# Patient Record
Sex: FEMALE | Race: WHITE | Employment: OTHER | URBAN - METROPOLITAN AREA
[De-identification: names, ages, dates, MRNs, and addresses within clinical notes are randomized per-mention and may not be internally consistent; named-entity substitution may affect disease eponyms.]

---

## 2017-02-20 DIAGNOSIS — I10 BENIGN ESSENTIAL HTN: ICD-10-CM

## 2017-02-20 DIAGNOSIS — R00.2 PALPITATIONS: ICD-10-CM

## 2017-02-20 RX ORDER — DILTIAZEM HYDROCHLORIDE 240 MG/1
CAPSULE, EXTENDED RELEASE ORAL
Qty: 90 CAPSULE | Refills: 1 | Status: SHIPPED | OUTPATIENT
Start: 2017-02-20 | End: 2017-03-09 | Stop reason: SDUPTHER

## 2017-02-20 RX ORDER — HYDROCHLOROTHIAZIDE 25 MG/1
TABLET ORAL
Qty: 90 TABLET | Refills: 1 | Status: SHIPPED | OUTPATIENT
Start: 2017-02-20 | End: 2017-03-09 | Stop reason: SDUPTHER

## 2017-02-20 RX ORDER — LOSARTAN POTASSIUM 100 MG/1
TABLET ORAL
Qty: 90 TABLET | Refills: 1 | Status: SHIPPED | OUTPATIENT
Start: 2017-02-20 | End: 2017-03-09 | Stop reason: SDUPTHER

## 2017-03-09 ENCOUNTER — OFFICE VISIT (OUTPATIENT)
Dept: PRIMARY CARE CLINIC | Age: 72
End: 2017-03-09

## 2017-03-09 VITALS
BODY MASS INDEX: 33.04 KG/M2 | SYSTOLIC BLOOD PRESSURE: 136 MMHG | HEIGHT: 68 IN | HEART RATE: 70 BPM | OXYGEN SATURATION: 94 % | TEMPERATURE: 97.6 F | RESPIRATION RATE: 16 BRPM | WEIGHT: 218 LBS | DIASTOLIC BLOOD PRESSURE: 80 MMHG

## 2017-03-09 DIAGNOSIS — I49.5 SSS (SICK SINUS SYNDROME) (HCC): Primary | ICD-10-CM

## 2017-03-09 DIAGNOSIS — R00.2 PALPITATIONS: ICD-10-CM

## 2017-03-09 DIAGNOSIS — I10 BENIGN ESSENTIAL HTN: ICD-10-CM

## 2017-03-09 PROCEDURE — 99214 OFFICE O/P EST MOD 30 MIN: CPT | Performed by: FAMILY MEDICINE

## 2017-03-09 RX ORDER — LOSARTAN POTASSIUM 100 MG/1
TABLET ORAL
Qty: 90 TABLET | Refills: 1 | Status: SHIPPED | OUTPATIENT
Start: 2017-03-09 | End: 2017-08-30 | Stop reason: SDUPTHER

## 2017-03-09 RX ORDER — DILTIAZEM HYDROCHLORIDE 240 MG/1
CAPSULE, EXTENDED RELEASE ORAL
Qty: 90 CAPSULE | Refills: 1 | Status: SHIPPED | OUTPATIENT
Start: 2017-03-09 | End: 2017-08-30 | Stop reason: SDUPTHER

## 2017-03-09 RX ORDER — HYDROCHLOROTHIAZIDE 25 MG/1
TABLET ORAL
Qty: 90 TABLET | Refills: 1 | Status: SHIPPED | OUTPATIENT
Start: 2017-03-09 | End: 2017-08-30 | Stop reason: SDUPTHER

## 2017-03-09 ASSESSMENT — ENCOUNTER SYMPTOMS
SHORTNESS OF BREATH: 0
ABDOMINAL PAIN: 0

## 2017-03-09 ASSESSMENT — PATIENT HEALTH QUESTIONNAIRE - PHQ9
SUM OF ALL RESPONSES TO PHQ9 QUESTIONS 1 & 2: 0
2. FEELING DOWN, DEPRESSED OR HOPELESS: 0
1. LITTLE INTEREST OR PLEASURE IN DOING THINGS: 0
SUM OF ALL RESPONSES TO PHQ QUESTIONS 1-9: 0

## 2017-03-24 ENCOUNTER — OFFICE VISIT (OUTPATIENT)
Dept: FAMILY MEDICINE CLINIC | Age: 72
End: 2017-03-24

## 2017-03-24 VITALS
RESPIRATION RATE: 24 BRPM | DIASTOLIC BLOOD PRESSURE: 88 MMHG | HEART RATE: 66 BPM | OXYGEN SATURATION: 93 % | SYSTOLIC BLOOD PRESSURE: 132 MMHG | TEMPERATURE: 97.9 F

## 2017-03-24 DIAGNOSIS — R10.2 PELVIC PAIN IN FEMALE: ICD-10-CM

## 2017-03-24 DIAGNOSIS — N30.90 CYSTITIS: ICD-10-CM

## 2017-03-24 DIAGNOSIS — R10.2 PELVIC PAIN IN FEMALE: Primary | ICD-10-CM

## 2017-03-24 LAB
BILIRUBIN, POC: NORMAL
BLOOD URINE, POC: NORMAL
CLARITY, POC: NORMAL
COLOR, POC: NORMAL
GLUCOSE URINE, POC: NORMAL
KETONES, POC: NORMAL
LEUKOCYTE EST, POC: 125
NITRITE, POC: NORMAL
PH, POC: 7
PROTEIN, POC: NORMAL
SPECIFIC GRAVITY, POC: 1.01
UROBILINOGEN, POC: 3.5

## 2017-03-24 PROCEDURE — 99213 OFFICE O/P EST LOW 20 MIN: CPT | Performed by: NURSE PRACTITIONER

## 2017-03-24 PROCEDURE — 81003 URINALYSIS AUTO W/O SCOPE: CPT | Performed by: NURSE PRACTITIONER

## 2017-03-24 RX ORDER — CIPROFLOXACIN 500 MG/1
500 TABLET, FILM COATED ORAL 2 TIMES DAILY
Qty: 20 TABLET | Refills: 0 | Status: SHIPPED | OUTPATIENT
Start: 2017-03-24 | End: 2017-04-03

## 2017-03-24 ASSESSMENT — ENCOUNTER SYMPTOMS
RHINORRHEA: 0
EYE DISCHARGE: 0
COUGH: 0
WHEEZING: 0
GASTROINTESTINAL NEGATIVE: 1
ALLERGIC/IMMUNOLOGIC NEGATIVE: 1
SORE THROAT: 0
VOICE CHANGE: 0
BACK PAIN: 1
BOWEL INCONTINENCE: 0
ABDOMINAL PAIN: 0
FACIAL SWELLING: 0
TROUBLE SWALLOWING: 0
EYE PAIN: 0
SINUS PRESSURE: 0
CHEST TIGHTNESS: 0
EYE ITCHING: 0
SHORTNESS OF BREATH: 0
EYE REDNESS: 0

## 2017-03-26 LAB — URINE CULTURE, ROUTINE: NORMAL

## 2017-06-05 ENCOUNTER — OFFICE VISIT (OUTPATIENT)
Dept: FAMILY MEDICINE CLINIC | Age: 72
End: 2017-06-05

## 2017-06-05 VITALS
BODY MASS INDEX: 32.89 KG/M2 | WEIGHT: 217 LBS | HEIGHT: 68 IN | SYSTOLIC BLOOD PRESSURE: 149 MMHG | TEMPERATURE: 98.5 F | RESPIRATION RATE: 16 BRPM | HEART RATE: 69 BPM | OXYGEN SATURATION: 95 % | DIASTOLIC BLOOD PRESSURE: 78 MMHG

## 2017-06-05 DIAGNOSIS — B37.0 ORAL CANDIDA: Primary | ICD-10-CM

## 2017-06-05 PROCEDURE — G8399 PT W/DXA RESULTS DOCUMENT: HCPCS | Performed by: NURSE PRACTITIONER

## 2017-06-05 PROCEDURE — 1123F ACP DISCUSS/DSCN MKR DOCD: CPT | Performed by: NURSE PRACTITIONER

## 2017-06-05 PROCEDURE — G8417 CALC BMI ABV UP PARAM F/U: HCPCS | Performed by: NURSE PRACTITIONER

## 2017-06-05 PROCEDURE — 4040F PNEUMOC VAC/ADMIN/RCVD: CPT | Performed by: NURSE PRACTITIONER

## 2017-06-05 PROCEDURE — G8427 DOCREV CUR MEDS BY ELIG CLIN: HCPCS | Performed by: NURSE PRACTITIONER

## 2017-06-05 PROCEDURE — 1090F PRES/ABSN URINE INCON ASSESS: CPT | Performed by: NURSE PRACTITIONER

## 2017-06-05 PROCEDURE — 3014F SCREEN MAMMO DOC REV: CPT | Performed by: NURSE PRACTITIONER

## 2017-06-05 PROCEDURE — 99213 OFFICE O/P EST LOW 20 MIN: CPT | Performed by: NURSE PRACTITIONER

## 2017-06-05 PROCEDURE — 3017F COLORECTAL CA SCREEN DOC REV: CPT | Performed by: NURSE PRACTITIONER

## 2017-06-05 PROCEDURE — 1036F TOBACCO NON-USER: CPT | Performed by: NURSE PRACTITIONER

## 2017-06-05 ASSESSMENT — ENCOUNTER SYMPTOMS
SORE THROAT: 1
SHORTNESS OF BREATH: 0
RHINORRHEA: 0
TROUBLE SWALLOWING: 0
SINUS PRESSURE: 0
WHEEZING: 0
CHEST TIGHTNESS: 0

## 2017-06-07 ENCOUNTER — OFFICE VISIT (OUTPATIENT)
Dept: PRIMARY CARE CLINIC | Age: 72
End: 2017-06-07

## 2017-06-07 VITALS
DIASTOLIC BLOOD PRESSURE: 84 MMHG | RESPIRATION RATE: 16 BRPM | BODY MASS INDEX: 32.89 KG/M2 | TEMPERATURE: 97.8 F | SYSTOLIC BLOOD PRESSURE: 126 MMHG | HEIGHT: 68 IN | HEART RATE: 80 BPM | WEIGHT: 217 LBS

## 2017-06-07 DIAGNOSIS — I10 BENIGN ESSENTIAL HTN: ICD-10-CM

## 2017-06-07 DIAGNOSIS — T78.40XA ALLERGIC REACTION, INITIAL ENCOUNTER: Primary | ICD-10-CM

## 2017-06-07 PROCEDURE — 99214 OFFICE O/P EST MOD 30 MIN: CPT | Performed by: FAMILY MEDICINE

## 2017-06-07 RX ORDER — CETIRIZINE HYDROCHLORIDE 10 MG/1
10 TABLET ORAL DAILY
Qty: 30 TABLET | Refills: 1 | Status: SHIPPED | OUTPATIENT
Start: 2017-06-07 | End: 2017-11-03

## 2017-06-07 RX ORDER — PREDNISONE 10 MG/1
TABLET ORAL
Qty: 20 TABLET | Refills: 0 | Status: SHIPPED | OUTPATIENT
Start: 2017-06-07 | End: 2017-06-21

## 2017-06-07 ASSESSMENT — ENCOUNTER SYMPTOMS
GLOBUS SENSATION: 0
ABDOMINAL PAIN: 0
DIARRHEA: 0
COUGH: 0
NAUSEA: 0
EYE ITCHING: 0
SINUS PRESSURE: 0
HYPERVENTILATION: 0
EYE WATERING: 0
WHEEZING: 0
STRIDOR: 0
SHORTNESS OF BREATH: 0
CONSTIPATION: 0
SORE THROAT: 0
RESPIRATORY NEGATIVE: 1
EYE REDNESS: 0
TROUBLE SWALLOWING: 0
BACK PAIN: 0
VOMITING: 0
DIFFICULTY BREATHING: 0
ALLERGIC REACTION: 1

## 2017-08-03 DIAGNOSIS — Z12.11 SCREENING FOR COLON CANCER: Primary | ICD-10-CM

## 2017-08-03 LAB
CONTROL: POSITIVE
HEMOCCULT STL QL: NEGATIVE

## 2017-08-03 PROCEDURE — 82274 ASSAY TEST FOR BLOOD FECAL: CPT | Performed by: FAMILY MEDICINE

## 2017-08-09 ENCOUNTER — OFFICE VISIT (OUTPATIENT)
Dept: FAMILY MEDICINE CLINIC | Age: 72
End: 2017-08-09

## 2017-08-09 VITALS
HEART RATE: 69 BPM | OXYGEN SATURATION: 97 % | RESPIRATION RATE: 16 BRPM | SYSTOLIC BLOOD PRESSURE: 116 MMHG | TEMPERATURE: 97.6 F | WEIGHT: 216.6 LBS | HEIGHT: 68 IN | BODY MASS INDEX: 32.83 KG/M2 | DIASTOLIC BLOOD PRESSURE: 76 MMHG

## 2017-08-09 DIAGNOSIS — R35.0 FREQUENCY OF URINATION: Primary | ICD-10-CM

## 2017-08-09 LAB
BILIRUBIN, POC: NORMAL
BLOOD URINE, POC: NORMAL
CLARITY, POC: NORMAL
COLOR, POC: NORMAL
GLUCOSE URINE, POC: NORMAL
KETONES, POC: NORMAL
LEUKOCYTE EST, POC: NORMAL
NITRITE, POC: NORMAL
PH, POC: 7
PROTEIN, POC: NORMAL
SPECIFIC GRAVITY, POC: 1.01
UROBILINOGEN, POC: 3.5

## 2017-08-09 PROCEDURE — 81002 URINALYSIS NONAUTO W/O SCOPE: CPT | Performed by: NURSE PRACTITIONER

## 2017-08-09 PROCEDURE — 99213 OFFICE O/P EST LOW 20 MIN: CPT | Performed by: NURSE PRACTITIONER

## 2017-08-09 ASSESSMENT — ENCOUNTER SYMPTOMS
BLOOD IN STOOL: 0
DIARRHEA: 0
ABDOMINAL PAIN: 1
CONSTIPATION: 0
ANAL BLEEDING: 0
NAUSEA: 0
ABDOMINAL DISTENTION: 0
RECTAL PAIN: 0
VOMITING: 0

## 2017-08-11 LAB — URINE CULTURE, ROUTINE: NORMAL

## 2017-08-30 ENCOUNTER — OFFICE VISIT (OUTPATIENT)
Dept: PRIMARY CARE CLINIC | Age: 72
End: 2017-08-30

## 2017-08-30 VITALS
DIASTOLIC BLOOD PRESSURE: 86 MMHG | HEIGHT: 68 IN | BODY MASS INDEX: 32.74 KG/M2 | HEART RATE: 64 BPM | TEMPERATURE: 97.6 F | SYSTOLIC BLOOD PRESSURE: 136 MMHG | WEIGHT: 216 LBS | OXYGEN SATURATION: 95 % | RESPIRATION RATE: 16 BRPM

## 2017-08-30 DIAGNOSIS — D51.9 ANEMIA DUE TO VITAMIN B12 DEFICIENCY, UNSPECIFIED B12 DEFICIENCY TYPE: ICD-10-CM

## 2017-08-30 DIAGNOSIS — Z12.31 SCREENING MAMMOGRAM, ENCOUNTER FOR: ICD-10-CM

## 2017-08-30 DIAGNOSIS — E78.5 DYSLIPIDEMIA: ICD-10-CM

## 2017-08-30 DIAGNOSIS — I10 BENIGN ESSENTIAL HTN: Primary | ICD-10-CM

## 2017-08-30 DIAGNOSIS — I49.5 SSS (SICK SINUS SYNDROME) (HCC): ICD-10-CM

## 2017-08-30 LAB
ALBUMIN SERPL-MCNC: 4.8 G/DL (ref 3.9–4.9)
ALP BLD-CCNC: 93 U/L (ref 40–130)
ALT SERPL-CCNC: 30 U/L (ref 0–33)
ANION GAP SERPL CALCULATED.3IONS-SCNC: 18 MEQ/L (ref 7–13)
AST SERPL-CCNC: 24 U/L (ref 0–35)
BILIRUB SERPL-MCNC: 0.5 MG/DL (ref 0–1.2)
BUN BLDV-MCNC: 12 MG/DL (ref 8–23)
CALCIUM SERPL-MCNC: 9.5 MG/DL (ref 8.6–10.2)
CHLORIDE BLD-SCNC: 100 MEQ/L (ref 98–107)
CHOLESTEROL, TOTAL: 189 MG/DL (ref 0–199)
CO2: 27 MEQ/L (ref 22–29)
CREAT SERPL-MCNC: 0.64 MG/DL (ref 0.5–0.9)
GFR AFRICAN AMERICAN: >60
GFR NON-AFRICAN AMERICAN: >60
GLOBULIN: 2.3 G/DL (ref 2.3–3.5)
GLUCOSE BLD-MCNC: 103 MG/DL (ref 74–109)
HCT VFR BLD CALC: 47.8 % (ref 37–47)
HDLC SERPL-MCNC: 48 MG/DL (ref 40–59)
HEMOGLOBIN: 15.8 G/DL (ref 12–16)
LDL CHOLESTEROL CALCULATED: 116 MG/DL (ref 0–129)
MCH RBC QN AUTO: 30.7 PG (ref 27–31.3)
MCHC RBC AUTO-ENTMCNC: 33 % (ref 33–37)
MCV RBC AUTO: 92.9 FL (ref 82–100)
PDW BLD-RTO: 12.8 % (ref 11.5–14.5)
PLATELET # BLD: 214 K/UL (ref 130–400)
POTASSIUM SERPL-SCNC: 3.8 MEQ/L (ref 3.5–5.1)
RBC # BLD: 5.14 M/UL (ref 4.2–5.4)
SODIUM BLD-SCNC: 145 MEQ/L (ref 132–144)
TOTAL PROTEIN: 7.1 G/DL (ref 6.4–8.1)
TRIGL SERPL-MCNC: 127 MG/DL (ref 0–200)
WBC # BLD: 6.3 K/UL (ref 4.8–10.8)

## 2017-08-30 PROCEDURE — 99214 OFFICE O/P EST MOD 30 MIN: CPT | Performed by: FAMILY MEDICINE

## 2017-08-30 RX ORDER — DILTIAZEM HYDROCHLORIDE 240 MG/1
CAPSULE, EXTENDED RELEASE ORAL
Qty: 90 CAPSULE | Refills: 1 | Status: SHIPPED | OUTPATIENT
Start: 2017-08-30 | End: 2018-02-28 | Stop reason: SDUPTHER

## 2017-08-30 RX ORDER — LOSARTAN POTASSIUM 100 MG/1
TABLET ORAL
Qty: 90 TABLET | Refills: 1 | Status: SHIPPED | OUTPATIENT
Start: 2017-08-30 | End: 2018-02-28 | Stop reason: SDUPTHER

## 2017-08-30 RX ORDER — HYDROCHLOROTHIAZIDE 25 MG/1
TABLET ORAL
Qty: 90 TABLET | Refills: 1 | Status: SHIPPED | OUTPATIENT
Start: 2017-08-30 | End: 2018-02-28 | Stop reason: SDUPTHER

## 2017-08-30 ASSESSMENT — ENCOUNTER SYMPTOMS
WHEEZING: 0
RESPIRATORY NEGATIVE: 1
DIARRHEA: 0
ORTHOPNEA: 0
BACK PAIN: 0
GASTROINTESTINAL NEGATIVE: 1
BLURRED VISION: 0
CONSTIPATION: 0
EYE ITCHING: 0
PHOTOPHOBIA: 0
EYES NEGATIVE: 1
SHORTNESS OF BREATH: 0
EYE REDNESS: 0
ABDOMINAL PAIN: 0

## 2017-09-11 ENCOUNTER — HOSPITAL ENCOUNTER (OUTPATIENT)
Dept: WOMENS IMAGING | Age: 72
Discharge: HOME | End: 2017-09-11
Payer: MEDICARE

## 2017-09-11 DIAGNOSIS — Z12.31 SCREENING MAMMOGRAM, ENCOUNTER FOR: ICD-10-CM

## 2017-09-11 PROCEDURE — G0202 SCR MAMMO BI INCL CAD: HCPCS

## 2017-11-03 ENCOUNTER — OFFICE VISIT (OUTPATIENT)
Dept: PRIMARY CARE CLINIC | Age: 72
End: 2017-11-03

## 2017-11-03 ENCOUNTER — TELEPHONE (OUTPATIENT)
Dept: ADMINISTRATIVE | Age: 72
End: 2017-11-03

## 2017-11-03 VITALS
HEIGHT: 68 IN | HEART RATE: 76 BPM | BODY MASS INDEX: 32.74 KG/M2 | RESPIRATION RATE: 16 BRPM | DIASTOLIC BLOOD PRESSURE: 78 MMHG | WEIGHT: 216 LBS | OXYGEN SATURATION: 95 % | TEMPERATURE: 98.1 F | SYSTOLIC BLOOD PRESSURE: 154 MMHG

## 2017-11-03 DIAGNOSIS — Z95.0 PRESENCE OF CARDIAC PACEMAKER: ICD-10-CM

## 2017-11-03 DIAGNOSIS — I10 BENIGN ESSENTIAL HTN: ICD-10-CM

## 2017-11-03 DIAGNOSIS — R00.2 HEART PALPITATIONS: ICD-10-CM

## 2017-11-03 DIAGNOSIS — I49.5 SSS (SICK SINUS SYNDROME) (HCC): ICD-10-CM

## 2017-11-03 DIAGNOSIS — R00.2 HEART PALPITATIONS: Primary | ICD-10-CM

## 2017-11-03 LAB
ANION GAP SERPL CALCULATED.3IONS-SCNC: 17 MEQ/L (ref 7–13)
BUN BLDV-MCNC: 12 MG/DL (ref 8–23)
CALCIUM SERPL-MCNC: 9.8 MG/DL (ref 8.6–10.2)
CHLORIDE BLD-SCNC: 100 MEQ/L (ref 98–107)
CO2: 26 MEQ/L (ref 22–29)
CREAT SERPL-MCNC: 0.84 MG/DL (ref 0.5–0.9)
GFR AFRICAN AMERICAN: >60
GFR NON-AFRICAN AMERICAN: >60
GLUCOSE BLD-MCNC: 93 MG/DL (ref 74–109)
MAGNESIUM: 2.3 MG/DL (ref 1.7–2.3)
POTASSIUM SERPL-SCNC: 3.7 MEQ/L (ref 3.5–5.1)
SODIUM BLD-SCNC: 143 MEQ/L (ref 132–144)

## 2017-11-03 PROCEDURE — 93000 ELECTROCARDIOGRAM COMPLETE: CPT | Performed by: FAMILY MEDICINE

## 2017-11-03 PROCEDURE — 99214 OFFICE O/P EST MOD 30 MIN: CPT | Performed by: FAMILY MEDICINE

## 2017-11-03 RX ORDER — NEBIVOLOL 5 MG/1
TABLET ORAL
Qty: 14 TABLET | Refills: 0 | COMMUNITY
Start: 2017-11-03 | End: 2018-02-28

## 2017-11-03 ASSESSMENT — ENCOUNTER SYMPTOMS
ABDOMINAL PAIN: 0
DIARRHEA: 0
BACK PAIN: 0
RESPIRATORY NEGATIVE: 1
CONSTIPATION: 0
VOMITING: 0
SHORTNESS OF BREATH: 0
SORE THROAT: 0
WHEEZING: 0
SINUS PRESSURE: 0
NAUSEA: 0
COUGH: 0

## 2017-11-03 NOTE — TELEPHONE ENCOUNTER
LM for patient to call office. Pt was having heart palpitations so I wanted to check on her to see how she is feeling.

## 2017-11-03 NOTE — PROGRESS NOTES
macrocrystals]; Nitrofuran derivatives; and Nystatin    Review of Systems   Constitutional: Negative for chills, fatigue, fever and malaise/fatigue. HENT: Negative for congestion, ear pain, sinus pressure and sore throat. Respiratory: Negative. Negative for cough, shortness of breath and wheezing. Cardiovascular: Positive for palpitations. Negative for chest pain. Gastrointestinal: Negative for abdominal pain, constipation, diarrhea, nausea and vomiting. Musculoskeletal: Negative for back pain and neck pain. Neurological: Negative for dizziness, numbness and headaches. Psychiatric/Behavioral: The patient is not nervous/anxious. Objective:   BP (!) 154/78 (Site: Right Arm, Position: Sitting, Cuff Size: Large Adult)   Pulse 76   Temp 98.1 °F (36.7 °C) (Tympanic)   Resp 16   Ht 5' 8\" (1.727 m)   Wt 216 lb (98 kg)   SpO2 95%   Breastfeeding? No   BMI 32.84 kg/m²     Physical Exam   Constitutional: She is oriented to person, place, and time. She appears well-developed and well-nourished. HENT:   Head: Normocephalic and atraumatic. Eyes: Conjunctivae and EOM are normal. Pupils are equal, round, and reactive to light. Neck: Normal range of motion. Neck supple. No thyromegaly present. Cardiovascular: Normal rate, regular rhythm and normal heart sounds. Occasional extrasystoles are present. No murmur heard. Pulmonary/Chest: Effort normal and breath sounds normal. She has no wheezes. She exhibits no tenderness. Abdominal: Soft. Bowel sounds are normal. There is no tenderness. Musculoskeletal: Normal range of motion. She exhibits no edema or tenderness. Lymphadenopathy:     She has no cervical adenopathy. Neurological: She is alert and oriented to person, place, and time. She has normal reflexes. Coordination normal.   Skin: Skin is warm and dry. No rash noted. Psychiatric: She has a normal mood and affect.  Thought content normal.   Nursing note and vitals

## 2018-01-09 ENCOUNTER — OFFICE VISIT (OUTPATIENT)
Dept: PRIMARY CARE CLINIC | Age: 73
End: 2018-01-09

## 2018-01-09 VITALS
DIASTOLIC BLOOD PRESSURE: 76 MMHG | WEIGHT: 217 LBS | HEART RATE: 69 BPM | HEIGHT: 68 IN | BODY MASS INDEX: 32.89 KG/M2 | OXYGEN SATURATION: 93 % | TEMPERATURE: 98.9 F | SYSTOLIC BLOOD PRESSURE: 130 MMHG | RESPIRATION RATE: 16 BRPM

## 2018-01-09 DIAGNOSIS — N39.0 URINARY TRACT INFECTION WITHOUT HEMATURIA, SITE UNSPECIFIED: ICD-10-CM

## 2018-01-09 DIAGNOSIS — I10 BENIGN ESSENTIAL HTN: ICD-10-CM

## 2018-01-09 DIAGNOSIS — R10.30 LOWER ABDOMINAL PAIN: Primary | ICD-10-CM

## 2018-01-09 LAB
BILIRUBIN, POC: ABNORMAL
BLOOD URINE, POC: ABNORMAL
CLARITY, POC: ABNORMAL
COLOR, POC: ABNORMAL
GLUCOSE URINE, POC: ABNORMAL
KETONES, POC: ABNORMAL
LEUKOCYTE EST, POC: ABNORMAL
NITRITE, POC: ABNORMAL
PH, POC: 6
PROTEIN, POC: ABNORMAL
SPECIFIC GRAVITY, POC: 1.01
UROBILINOGEN, POC: ABNORMAL

## 2018-01-09 PROCEDURE — 99214 OFFICE O/P EST MOD 30 MIN: CPT | Performed by: FAMILY MEDICINE

## 2018-01-09 PROCEDURE — 81002 URINALYSIS NONAUTO W/O SCOPE: CPT | Performed by: FAMILY MEDICINE

## 2018-01-09 ASSESSMENT — ENCOUNTER SYMPTOMS
WHEEZING: 0
FLATUS: 1
CONSTIPATION: 0
BACK PAIN: 1
DIARRHEA: 0
COUGH: 0
NAUSEA: 0
SORE THROAT: 0
ABDOMINAL PAIN: 1
VOMITING: 0
RESPIRATORY NEGATIVE: 1
SINUS PRESSURE: 0
SHORTNESS OF BREATH: 0

## 2018-01-11 LAB — URINE CULTURE, ROUTINE: NORMAL

## 2018-01-18 ENCOUNTER — HOSPITAL ENCOUNTER (OUTPATIENT)
Dept: CT IMAGING | Age: 73
Discharge: HOME | End: 2018-01-18
Payer: MEDICARE

## 2018-01-18 DIAGNOSIS — R10.30 LOWER ABDOMINAL PAIN: ICD-10-CM

## 2018-01-18 PROCEDURE — 2500000003 HC RX 250 WO HCPCS: Performed by: FAMILY MEDICINE

## 2018-01-18 PROCEDURE — 74177 CT ABD & PELVIS W/CONTRAST: CPT

## 2018-01-18 PROCEDURE — 6360000004 HC RX CONTRAST MEDICATION: Performed by: FAMILY MEDICINE

## 2018-01-18 RX ADMIN — IOPAMIDOL 100 ML: 755 INJECTION, SOLUTION INTRAVENOUS at 10:37

## 2018-01-18 RX ADMIN — BARIUM SULFATE 450 ML: 20 SUSPENSION ORAL at 09:30

## 2018-01-23 ENCOUNTER — OFFICE VISIT (OUTPATIENT)
Dept: PRIMARY CARE CLINIC | Age: 73
End: 2018-01-23
Payer: MEDICARE

## 2018-01-23 VITALS
WEIGHT: 217 LBS | SYSTOLIC BLOOD PRESSURE: 160 MMHG | OXYGEN SATURATION: 97 % | DIASTOLIC BLOOD PRESSURE: 80 MMHG | HEART RATE: 82 BPM | RESPIRATION RATE: 18 BRPM | BODY MASS INDEX: 32.89 KG/M2 | TEMPERATURE: 97.9 F | HEIGHT: 68 IN

## 2018-01-23 DIAGNOSIS — Z95.0 PRESENCE OF CARDIAC PACEMAKER: ICD-10-CM

## 2018-01-23 DIAGNOSIS — Z12.4 ENCOUNTER FOR SCREENING FOR MALIGNANT NEOPLASM OF CERVIX: ICD-10-CM

## 2018-01-23 DIAGNOSIS — R00.2 PALPITATIONS: ICD-10-CM

## 2018-01-23 DIAGNOSIS — I49.5 SSS (SICK SINUS SYNDROME) (HCC): ICD-10-CM

## 2018-01-23 DIAGNOSIS — R10.2 PELVIC PAIN IN FEMALE: ICD-10-CM

## 2018-01-23 DIAGNOSIS — I10 BENIGN ESSENTIAL HTN: Primary | ICD-10-CM

## 2018-01-23 PROCEDURE — 99214 OFFICE O/P EST MOD 30 MIN: CPT | Performed by: FAMILY MEDICINE

## 2018-01-23 RX ORDER — CIPROFLOXACIN 500 MG/1
TABLET, FILM COATED ORAL
Qty: 20 TABLET | Refills: 0 | Status: SHIPPED | OUTPATIENT
Start: 2018-01-23 | End: 2018-02-02

## 2018-01-23 ASSESSMENT — ENCOUNTER SYMPTOMS
BACK PAIN: 1
DIARRHEA: 0
VOMITING: 0
NAUSEA: 0
HEMATOCHEZIA: 0
ABDOMINAL PAIN: 1
RESPIRATORY NEGATIVE: 1
EYE REDNESS: 0
SHORTNESS OF BREATH: 0
CONSTIPATION: 0
EYES NEGATIVE: 1
EYE ITCHING: 0
PHOTOPHOBIA: 0
WHEEZING: 0
FLATUS: 0
BELCHING: 0

## 2018-01-23 NOTE — PROGRESS NOTES
right labia. There is no rash or lesion on the left labia. Cervix exhibits no discharge and no friability. There is bleeding in the vagina. No tenderness in the vagina. No vaginal discharge found. Musculoskeletal: Normal range of motion. She exhibits no edema or tenderness. Lymphadenopathy:     She has no cervical adenopathy. Neurological: She is alert and oriented to person, place, and time. She has normal reflexes. Coordination normal.   Skin: Skin is warm and dry. No rash noted. Psychiatric: She has a normal mood and affect. Thought content normal.   Nursing note and vitals reviewed. Assessment:     1. Benign essential HTN     2. SSS (sick sinus syndrome) ([facility] [address]     3. Presence of cardiac pacemaker     4. Palpitations     5. Pelvic pain in female  US Pelvis Complete    ciprofloxacin (CIPRO) 500 MG tablet   6. Encounter for screening for malignant neoplasm of cervix   PAP SMEAR       Plan:      Orders Placed This Encounter   Procedures    US Pelvis Complete     Standing Status:   Future     Standing Expiration Date:   1/23/2019    PAP SMEAR     Patient History:    No LMP recorded. Patient is postmenopausal.  OBGYN Status: Postmenopausal  Past Surgical History:  1997: PACEMAKER INSERTION      Smoking status: Never Smoker                                                              Smokeless tobacco: Never Used                           Standing Status:   Future     Standing Expiration Date:   1/23/2019     Order Specific Question:   Collection Type     Answer: Thin Prep     Order Specific Question:   Prior Abnormal Pap Test     Answer:   No     Order Specific Question:   Screening or Diagnostic     Answer:   Screening     Order Specific Question:   HPV Requested?      Answer:   Yes - If Abnormal Reflex HPV     Order Specific Question:   High Risk Patient     Answer:   N/A     Orders Placed This Encounter   Medications    ciprofloxacin (CIPRO) 500 MG tablet     Sig: One bid     Dispense:  20 tablet

## 2018-01-24 ENCOUNTER — HOSPITAL ENCOUNTER (OUTPATIENT)
Dept: ULTRASOUND IMAGING | Age: 73
Discharge: HOME | End: 2018-01-24
Payer: MEDICARE

## 2018-01-24 DIAGNOSIS — R10.2 PELVIC PAIN IN FEMALE: ICD-10-CM

## 2018-01-24 PROCEDURE — 76856 US EXAM PELVIC COMPLETE: CPT

## 2018-01-24 PROCEDURE — 76830 TRANSVAGINAL US NON-OB: CPT

## 2018-01-26 ENCOUNTER — TELEPHONE (OUTPATIENT)
Dept: PRIMARY CARE CLINIC | Age: 73
End: 2018-01-26

## 2018-02-28 ENCOUNTER — OFFICE VISIT (OUTPATIENT)
Dept: PRIMARY CARE CLINIC | Age: 73
End: 2018-02-28
Payer: MEDICARE

## 2018-02-28 VITALS
OXYGEN SATURATION: 95 % | HEIGHT: 68 IN | WEIGHT: 217 LBS | TEMPERATURE: 97.8 F | BODY MASS INDEX: 32.89 KG/M2 | SYSTOLIC BLOOD PRESSURE: 160 MMHG | HEART RATE: 57 BPM | RESPIRATION RATE: 18 BRPM | DIASTOLIC BLOOD PRESSURE: 78 MMHG

## 2018-02-28 DIAGNOSIS — I10 BENIGN ESSENTIAL HTN: Primary | ICD-10-CM

## 2018-02-28 DIAGNOSIS — I49.5 SSS (SICK SINUS SYNDROME) (HCC): ICD-10-CM

## 2018-02-28 DIAGNOSIS — Z95.0 PRESENCE OF CARDIAC PACEMAKER: ICD-10-CM

## 2018-02-28 PROCEDURE — 99213 OFFICE O/P EST LOW 20 MIN: CPT | Performed by: FAMILY MEDICINE

## 2018-02-28 RX ORDER — DILTIAZEM HYDROCHLORIDE 240 MG/1
CAPSULE, EXTENDED RELEASE ORAL
Qty: 90 CAPSULE | Refills: 1 | Status: SHIPPED | OUTPATIENT
Start: 2018-02-28 | End: 2018-10-01

## 2018-02-28 RX ORDER — HYDRALAZINE HYDROCHLORIDE 25 MG/1
25 TABLET, FILM COATED ORAL 2 TIMES DAILY
Qty: 60 TABLET | Refills: 1 | Status: SHIPPED | OUTPATIENT
Start: 2018-02-28 | End: 2018-03-30 | Stop reason: DRUGHIGH

## 2018-02-28 RX ORDER — LOSARTAN POTASSIUM 100 MG/1
TABLET ORAL
Qty: 90 TABLET | Refills: 1 | Status: SHIPPED | OUTPATIENT
Start: 2018-02-28 | End: 2018-03-30

## 2018-02-28 RX ORDER — HYDROCHLOROTHIAZIDE 25 MG/1
TABLET ORAL
Qty: 90 TABLET | Refills: 1 | Status: SHIPPED | OUTPATIENT
Start: 2018-02-28 | End: 2018-06-14

## 2018-02-28 ASSESSMENT — ENCOUNTER SYMPTOMS
BACK PAIN: 0
BLURRED VISION: 0
COUGH: 0
SHORTNESS OF BREATH: 0
ORTHOPNEA: 0
ABDOMINAL DISTENTION: 0
BLOOD IN STOOL: 0

## 2018-03-30 ENCOUNTER — OFFICE VISIT (OUTPATIENT)
Dept: PRIMARY CARE CLINIC | Age: 73
End: 2018-03-30
Payer: MEDICARE

## 2018-03-30 VITALS
HEIGHT: 68 IN | DIASTOLIC BLOOD PRESSURE: 80 MMHG | RESPIRATION RATE: 14 BRPM | OXYGEN SATURATION: 96 % | WEIGHT: 217.6 LBS | BODY MASS INDEX: 32.98 KG/M2 | SYSTOLIC BLOOD PRESSURE: 142 MMHG | HEART RATE: 72 BPM | TEMPERATURE: 97.9 F

## 2018-03-30 DIAGNOSIS — Z95.0 PRESENCE OF CARDIAC PACEMAKER: ICD-10-CM

## 2018-03-30 DIAGNOSIS — I49.5 SSS (SICK SINUS SYNDROME) (HCC): ICD-10-CM

## 2018-03-30 DIAGNOSIS — R00.2 PALPITATIONS: ICD-10-CM

## 2018-03-30 DIAGNOSIS — I10 BENIGN ESSENTIAL HTN: Primary | ICD-10-CM

## 2018-03-30 PROCEDURE — 99213 OFFICE O/P EST LOW 20 MIN: CPT | Performed by: FAMILY MEDICINE

## 2018-03-30 RX ORDER — LOSARTAN POTASSIUM 100 MG/1
TABLET ORAL
Qty: 90 TABLET | Refills: 1 | Status: SHIPPED | OUTPATIENT
Start: 2018-03-30 | End: 2018-10-09 | Stop reason: SDUPTHER

## 2018-03-30 RX ORDER — HYDRALAZINE HYDROCHLORIDE 25 MG/1
25 TABLET, FILM COATED ORAL DAILY
Qty: 60 TABLET | Refills: 1 | Status: SHIPPED | OUTPATIENT
Start: 2018-03-30 | End: 2018-06-14

## 2018-03-30 ASSESSMENT — ENCOUNTER SYMPTOMS
APNEA: 0
STRIDOR: 0
CHEST TIGHTNESS: 0
CONSTIPATION: 0
PHOTOPHOBIA: 0
DIARRHEA: 0
SHORTNESS OF BREATH: 0
EYE REDNESS: 0
EYE DISCHARGE: 0
NAUSEA: 0
CHOKING: 0
COLOR CHANGE: 0
ORTHOPNEA: 0
ABDOMINAL PAIN: 0
BLURRED VISION: 0
FACIAL SWELLING: 0
EYE PAIN: 0
WHEEZING: 0

## 2018-03-30 NOTE — PROGRESS NOTES
Subjective:      Patient ID: Brendan Montes is a 67 y.o. female who presents today for:  Chief Complaint   Patient presents with    Hypertension     follow up. LOV prscribed hyralazine. States this medication is causing palpatations at night, would like to discuss. Denies chest pain, SOB, dizziness. Hypertension   This is a chronic problem. The current episode started more than 1 year ago. The problem has been waxing and waning since onset. The problem is controlled. Associated symptoms include palpitations. Pertinent negatives include no anxiety, blurred vision, chest pain, headaches, malaise/fatigue, neck pain, orthopnea, peripheral edema, PND, shortness of breath or sweats. There are no associated agents to hypertension. Risk factors for coronary artery disease include obesity and dyslipidemia. The current treatment provides moderate improvement. There are no compliance problems. Patient states her new medication hydralazine is causing her to have palpitations, they're worse at night. She is unsure if it's the medication or something else. She has had an echo done in the past (2013), her last EKG was done in 11/2017. She has a pacemaker, battery is to be replaced later this year, and does see a cardiologist regularly. She was taken off of the [facility] last visit.        Past Medical History:   Diagnosis Date    Hx of syncope     Hypertension     Presence of cardiac pacemaker 1/23/2008    [employer] ; 1997 [employer] leads    Second degree AV block 1997    did not progress to complete heart block     Past Surgical History:   Procedure Laterality Date    PACEMAKER INSERTION  1997     Family History   Problem Relation Age of Onset    High Blood Pressure Mother     Other Father     Substance Abuse Father      Social History     Social History    Marital status:      Spouse name: N/A    Number of children: N/A    Years of education: N/A     Occupational History    Not on 33.09 kg/m²     Physical Exam   Constitutional: She is oriented to person, place, and time. She appears well-developed and well-nourished. HENT:   Head: Normocephalic and atraumatic. Eyes: Conjunctivae and EOM are normal. Pupils are equal, round, and reactive to light. Neck: Normal range of motion. Neck supple. No thyromegaly present. Cardiovascular: Normal rate, regular rhythm and normal heart sounds. No murmur heard. Pulmonary/Chest: Effort normal and breath sounds normal. She has no wheezes. She exhibits no tenderness. Abdominal: Soft. Bowel sounds are normal. There is no tenderness. Musculoskeletal: Normal range of motion. She exhibits no edema or tenderness. Lymphadenopathy:     She has no cervical adenopathy. Neurological: She is alert and oriented to person, place, and time. She has normal reflexes. Coordination normal.   Skin: Skin is warm and dry. No rash noted. Psychiatric: She has a normal mood and affect. Thought content normal.   Nursing note and vitals reviewed. Assessment:     1. Benign essential HTN  losartan (COZAAR) 100 MG tablet    hydrALAZINE (APRESOLINE) 25 MG tablet   2. Palpitations  Amb External Referral To Cardiac Electrophysiology   3. SSS (sick sinus syndrome) ([facility] [address]     4.  Presence of cardiac pacemaker         Plan:      Orders Placed This Encounter   Procedures    Amb External Referral To Cardiac Electrophysiology     Referral Priority:   Routine     Referral Type:   Consult for Advice and Opinion     Referral Reason:   Specialty Services Required     Referred to Provider:   Krysten Laird MD     Requested Specialty:   Cardiac Electrophysiology     Number of Visits Requested:   1     Orders Placed This Encounter   Medications    losartan (COZAAR) 100 MG tablet     Sig: take 1 tablet by mouth once daily     Dispense:  90 tablet     Refill:  1    hydrALAZINE (APRESOLINE) 25 MG tablet     Sig: Take 1 tablet by mouth daily     Dispense:  60 tablet     Refill: 1       Controlled Substances Monitoring:      Return in about 3 months (around 6/30/2018) for Review of Labs & Diagnostic Testing, Routine follow up. Anjelica Dave CMA  , am scribing for and in the presence of Tianna Rashid DO. Electronically signed by : James Vigil CMA      I, Tianna Rashid DO, personally performed the services described in this documentation, as scribed by James Vigil in my presence, and it is both accurate and complete.  Electronically signed by: Tianna Rashid DO    3/31/18 5:38 PM    Tianna Rashid DO

## 2018-05-02 ENCOUNTER — APPOINTMENT (OUTPATIENT)
Dept: GENERAL RADIOLOGY | Age: 73
End: 2018-05-02
Payer: MEDICARE

## 2018-05-02 ENCOUNTER — HOSPITAL ENCOUNTER (EMERGENCY)
Age: 73
Discharge: HOME | End: 2018-05-02
Attending: EMERGENCY MEDICINE
Payer: MEDICARE

## 2018-05-02 VITALS
WEIGHT: 220.46 LBS | RESPIRATION RATE: 18 BRPM | HEART RATE: 75 BPM | BODY MASS INDEX: 33.41 KG/M2 | SYSTOLIC BLOOD PRESSURE: 172 MMHG | HEIGHT: 68 IN | OXYGEN SATURATION: 99 % | DIASTOLIC BLOOD PRESSURE: 77 MMHG

## 2018-05-02 DIAGNOSIS — I48.0 PAROXYSMAL ATRIAL FIBRILLATION (HCC): Primary | ICD-10-CM

## 2018-05-02 DIAGNOSIS — I10 ESSENTIAL HYPERTENSION: ICD-10-CM

## 2018-05-02 DIAGNOSIS — R00.2 PALPITATIONS: ICD-10-CM

## 2018-05-02 LAB
ALBUMIN SERPL-MCNC: 5.1 G/DL (ref 3.9–4.9)
ALP BLD-CCNC: 97 U/L (ref 40–130)
ALT SERPL-CCNC: 31 U/L (ref 0–33)
ANION GAP SERPL CALCULATED.3IONS-SCNC: 17 MEQ/L (ref 7–13)
AST SERPL-CCNC: 24 U/L (ref 0–35)
BASOPHILS ABSOLUTE: 0 K/UL (ref 0–0.2)
BASOPHILS RELATIVE PERCENT: 0.2 %
BILIRUB SERPL-MCNC: 0.2 MG/DL (ref 0–1.2)
BUN BLDV-MCNC: 12 MG/DL (ref 8–23)
CALCIUM SERPL-MCNC: 10.4 MG/DL (ref 8.6–10.2)
CHLORIDE BLD-SCNC: 101 MEQ/L (ref 98–107)
CO2: 27 MEQ/L (ref 22–29)
CREAT SERPL-MCNC: 0.95 MG/DL (ref 0.5–0.9)
EKG ATRIAL RATE: 129 BPM
EKG ATRIAL RATE: 72 BPM
EKG P AXIS: 16 DEGREES
EKG P-R INTERVAL: 172 MS
EKG Q-T INTERVAL: 312 MS
EKG Q-T INTERVAL: 382 MS
EKG QRS DURATION: 90 MS
EKG QRS DURATION: 96 MS
EKG QTC CALCULATION (BAZETT): 418 MS
EKG QTC CALCULATION (BAZETT): 438 MS
EKG R AXIS: 28 DEGREES
EKG R AXIS: 5 DEGREES
EKG T AXIS: -17 DEGREES
EKG T AXIS: 248 DEGREES
EKG VENTRICULAR RATE: 119 BPM
EKG VENTRICULAR RATE: 72 BPM
EOSINOPHILS ABSOLUTE: 0.1 K/UL (ref 0–0.7)
EOSINOPHILS RELATIVE PERCENT: 1.1 %
GFR AFRICAN AMERICAN: >60
GFR NON-AFRICAN AMERICAN: 57.7
GLOBULIN: 2.7 G/DL (ref 2.3–3.5)
GLUCOSE BLD-MCNC: 132 MG/DL (ref 74–109)
HCT VFR BLD CALC: 46.2 % (ref 37–47)
HEMOGLOBIN: 16 G/DL (ref 12–16)
INR BLD: 1
LYMPHOCYTES ABSOLUTE: 3.4 K/UL (ref 1–4.8)
LYMPHOCYTES RELATIVE PERCENT: 38 %
MAGNESIUM: 2 MG/DL (ref 1.7–2.3)
MCH RBC QN AUTO: 31.3 PG (ref 27–31.3)
MCHC RBC AUTO-ENTMCNC: 34.7 % (ref 33–37)
MCV RBC AUTO: 90.2 FL (ref 82–100)
MONOCYTES ABSOLUTE: 0.6 K/UL (ref 0.2–0.8)
MONOCYTES RELATIVE PERCENT: 6.6 %
NEUTROPHILS ABSOLUTE: 4.8 K/UL (ref 1.4–6.5)
NEUTROPHILS RELATIVE PERCENT: 54.1 %
PDW BLD-RTO: 12.7 % (ref 11.5–14.5)
PLATELET # BLD: 242 K/UL (ref 130–400)
POTASSIUM SERPL-SCNC: 3.3 MEQ/L (ref 3.5–5.1)
PRO-BNP: 74 PG/ML
PROTHROMBIN TIME: 9.9 SEC (ref 9.6–12.3)
RBC # BLD: 5.13 M/UL (ref 4.2–5.4)
SODIUM BLD-SCNC: 145 MEQ/L (ref 132–144)
TOTAL PROTEIN: 7.8 G/DL (ref 6.4–8.1)
TROPONIN: <0.01 NG/ML (ref 0–0.01)
WBC # BLD: 8.9 K/UL (ref 4.8–10.8)

## 2018-05-02 PROCEDURE — 96374 THER/PROPH/DIAG INJ IV PUSH: CPT

## 2018-05-02 PROCEDURE — 2580000003 HC RX 258: Performed by: EMERGENCY MEDICINE

## 2018-05-02 PROCEDURE — 6360000002 HC RX W HCPCS: Performed by: EMERGENCY MEDICINE

## 2018-05-02 PROCEDURE — 6370000000 HC RX 637 (ALT 250 FOR IP): Performed by: EMERGENCY MEDICINE

## 2018-05-02 PROCEDURE — 96372 THER/PROPH/DIAG INJ SC/IM: CPT

## 2018-05-02 PROCEDURE — 84484 ASSAY OF TROPONIN QUANT: CPT

## 2018-05-02 PROCEDURE — 85025 COMPLETE CBC W/AUTO DIFF WBC: CPT

## 2018-05-02 PROCEDURE — 96375 TX/PRO/DX INJ NEW DRUG ADDON: CPT

## 2018-05-02 PROCEDURE — 83735 ASSAY OF MAGNESIUM: CPT

## 2018-05-02 PROCEDURE — 85610 PROTHROMBIN TIME: CPT

## 2018-05-02 PROCEDURE — 80053 COMPREHEN METABOLIC PANEL: CPT

## 2018-05-02 PROCEDURE — 2500000003 HC RX 250 WO HCPCS: Performed by: EMERGENCY MEDICINE

## 2018-05-02 PROCEDURE — 84443 ASSAY THYROID STIM HORMONE: CPT

## 2018-05-02 PROCEDURE — 71045 X-RAY EXAM CHEST 1 VIEW: CPT

## 2018-05-02 PROCEDURE — 93005 ELECTROCARDIOGRAM TRACING: CPT

## 2018-05-02 PROCEDURE — 99285 EMERGENCY DEPT VISIT HI MDM: CPT

## 2018-05-02 PROCEDURE — 36415 COLL VENOUS BLD VENIPUNCTURE: CPT

## 2018-05-02 PROCEDURE — 83880 ASSAY OF NATRIURETIC PEPTIDE: CPT

## 2018-05-02 RX ORDER — POTASSIUM CHLORIDE 750 MG/1
20 TABLET, FILM COATED, EXTENDED RELEASE ORAL ONCE
Status: COMPLETED | OUTPATIENT
Start: 2018-05-02 | End: 2018-05-02

## 2018-05-02 RX ORDER — SODIUM CHLORIDE 0.9 % (FLUSH) 0.9 %
3 SYRINGE (ML) INJECTION EVERY 8 HOURS
Status: DISCONTINUED | OUTPATIENT
Start: 2018-05-02 | End: 2018-05-03 | Stop reason: HOSPADM

## 2018-05-02 RX ORDER — DILTIAZEM HYDROCHLORIDE 5 MG/ML
10 INJECTION INTRAVENOUS ONCE
Status: COMPLETED | OUTPATIENT
Start: 2018-05-02 | End: 2018-05-02

## 2018-05-02 RX ORDER — HYDRALAZINE HYDROCHLORIDE 20 MG/ML
10 INJECTION INTRAMUSCULAR; INTRAVENOUS ONCE
Status: COMPLETED | OUTPATIENT
Start: 2018-05-02 | End: 2018-05-02

## 2018-05-02 RX ORDER — METOPROLOL TARTRATE 5 MG/5ML
5 INJECTION INTRAVENOUS ONCE
Status: DISCONTINUED | OUTPATIENT
Start: 2018-05-02 | End: 2018-05-02

## 2018-05-02 RX ORDER — LORAZEPAM 2 MG/ML
1 INJECTION INTRAMUSCULAR ONCE
Status: DISCONTINUED | OUTPATIENT
Start: 2018-05-02 | End: 2018-05-02

## 2018-05-02 RX ADMIN — DILTIAZEM HYDROCHLORIDE 10 MG: 5 INJECTION INTRAVENOUS at 21:36

## 2018-05-02 RX ADMIN — HYDRALAZINE HYDROCHLORIDE 10 MG: 20 INJECTION INTRAMUSCULAR; INTRAVENOUS at 22:25

## 2018-05-02 RX ADMIN — ENOXAPARIN SODIUM 100 MG: 100 INJECTION SUBCUTANEOUS at 21:53

## 2018-05-02 RX ADMIN — POTASSIUM CHLORIDE 20 MEQ: 10 TABLET, EXTENDED RELEASE ORAL at 22:08

## 2018-05-02 RX ADMIN — Medication 3 ML: at 21:35

## 2018-05-02 ASSESSMENT — ENCOUNTER SYMPTOMS
SORE THROAT: 0
SHORTNESS OF BREATH: 0
EYE DISCHARGE: 0
SINUS PRESSURE: 0
VOMITING: 0
EYE REDNESS: 0
TROUBLE SWALLOWING: 0
ABDOMINAL PAIN: 0
COUGH: 0
EYE PAIN: 0
CHEST TIGHTNESS: 0
BACK PAIN: 0
CHOKING: 0
CONSTIPATION: 0
WHEEZING: 0
VOICE CHANGE: 0
BLOOD IN STOOL: 0
STRIDOR: 0
FACIAL SWELLING: 0
DIARRHEA: 0

## 2018-05-03 ENCOUNTER — OFFICE VISIT (OUTPATIENT)
Dept: PRIMARY CARE CLINIC | Age: 73
End: 2018-05-03
Payer: MEDICARE

## 2018-05-03 VITALS
RESPIRATION RATE: 14 BRPM | HEART RATE: 74 BPM | WEIGHT: 215 LBS | DIASTOLIC BLOOD PRESSURE: 72 MMHG | BODY MASS INDEX: 32.58 KG/M2 | SYSTOLIC BLOOD PRESSURE: 136 MMHG | HEIGHT: 68 IN | OXYGEN SATURATION: 95 % | TEMPERATURE: 98.4 F

## 2018-05-03 DIAGNOSIS — R00.2 PALPITATIONS: Primary | ICD-10-CM

## 2018-05-03 DIAGNOSIS — Z95.0 PRESENCE OF CARDIAC PACEMAKER: ICD-10-CM

## 2018-05-03 DIAGNOSIS — I10 BENIGN ESSENTIAL HTN: ICD-10-CM

## 2018-05-03 LAB — TSH SERPL DL<=0.05 MIU/L-ACNC: 2.82 UIU/ML (ref 0.27–4.2)

## 2018-05-03 PROCEDURE — 99213 OFFICE O/P EST LOW 20 MIN: CPT | Performed by: FAMILY MEDICINE

## 2018-05-03 ASSESSMENT — ENCOUNTER SYMPTOMS
CONSTIPATION: 0
DIARRHEA: 0
WHEEZING: 0
EYE ITCHING: 0
EYE REDNESS: 0
SHORTNESS OF BREATH: 0
PHOTOPHOBIA: 0
ABDOMINAL PAIN: 0
RESPIRATORY NEGATIVE: 1
BACK PAIN: 0
EYES NEGATIVE: 1
GASTROINTESTINAL NEGATIVE: 1

## 2018-05-09 ASSESSMENT — ENCOUNTER SYMPTOMS: CHEST TIGHTNESS: 0

## 2018-06-14 ENCOUNTER — OFFICE VISIT (OUTPATIENT)
Dept: PRIMARY CARE CLINIC | Age: 73
End: 2018-06-14
Payer: MEDICARE

## 2018-06-14 VITALS
TEMPERATURE: 98.1 F | DIASTOLIC BLOOD PRESSURE: 82 MMHG | SYSTOLIC BLOOD PRESSURE: 132 MMHG | RESPIRATION RATE: 14 BRPM | HEIGHT: 68 IN | HEART RATE: 78 BPM | OXYGEN SATURATION: 95 % | BODY MASS INDEX: 32.58 KG/M2 | WEIGHT: 215 LBS

## 2018-06-14 DIAGNOSIS — I48.0 PAF (PAROXYSMAL ATRIAL FIBRILLATION) (HCC): ICD-10-CM

## 2018-06-14 DIAGNOSIS — Z95.0 PRESENCE OF CARDIAC PACEMAKER: ICD-10-CM

## 2018-06-14 DIAGNOSIS — I10 BENIGN ESSENTIAL HTN: ICD-10-CM

## 2018-06-14 DIAGNOSIS — R00.2 PALPITATIONS: Primary | ICD-10-CM

## 2018-06-14 PROCEDURE — 99214 OFFICE O/P EST MOD 30 MIN: CPT | Performed by: FAMILY MEDICINE

## 2018-06-14 RX ORDER — RIVAROXABAN 20 MG/1
TABLET, FILM COATED ORAL
Refills: 0 | COMMUNITY
Start: 2018-05-24

## 2018-06-14 RX ORDER — SOTALOL HYDROCHLORIDE 80 MG/1
TABLET ORAL
Refills: 0 | COMMUNITY
Start: 2018-05-31

## 2018-06-14 ASSESSMENT — ENCOUNTER SYMPTOMS
GASTROINTESTINAL NEGATIVE: 1
EYE REDNESS: 0
RESPIRATORY NEGATIVE: 1
EYE ITCHING: 0
DIARRHEA: 0
SHORTNESS OF BREATH: 0
PHOTOPHOBIA: 0
BACK PAIN: 0
CONSTIPATION: 0
WHEEZING: 0
ABDOMINAL PAIN: 0
EYES NEGATIVE: 1

## 2018-10-01 ENCOUNTER — OFFICE VISIT (OUTPATIENT)
Dept: PRIMARY CARE CLINIC | Age: 73
End: 2018-10-01
Payer: MEDICARE

## 2018-10-01 VITALS
HEART RATE: 51 BPM | RESPIRATION RATE: 16 BRPM | TEMPERATURE: 97.6 F | SYSTOLIC BLOOD PRESSURE: 126 MMHG | WEIGHT: 216 LBS | BODY MASS INDEX: 32.74 KG/M2 | HEIGHT: 68 IN | OXYGEN SATURATION: 97 % | DIASTOLIC BLOOD PRESSURE: 80 MMHG

## 2018-10-01 DIAGNOSIS — Z12.31 SCREENING MAMMOGRAM, ENCOUNTER FOR: ICD-10-CM

## 2018-10-01 DIAGNOSIS — Z12.11 COLON CANCER SCREENING: ICD-10-CM

## 2018-10-01 DIAGNOSIS — Z95.0 PRESENCE OF CARDIAC PACEMAKER: ICD-10-CM

## 2018-10-01 DIAGNOSIS — I10 BENIGN ESSENTIAL HTN: Primary | ICD-10-CM

## 2018-10-01 DIAGNOSIS — I49.5 SSS (SICK SINUS SYNDROME) (HCC): ICD-10-CM

## 2018-10-01 PROCEDURE — 99214 OFFICE O/P EST MOD 30 MIN: CPT | Performed by: FAMILY MEDICINE

## 2018-10-01 RX ORDER — DILTIAZEM HYDROCHLORIDE 180 MG/1
180 CAPSULE, COATED, EXTENDED RELEASE ORAL DAILY
Qty: 90 CAPSULE | Refills: 1 | Status: SHIPPED | OUTPATIENT
Start: 2018-10-01

## 2018-10-01 ASSESSMENT — ENCOUNTER SYMPTOMS
EYE REDNESS: 0
EYE ITCHING: 0
ABDOMINAL PAIN: 0
RESPIRATORY NEGATIVE: 1
ORTHOPNEA: 0
EYES NEGATIVE: 1
CONSTIPATION: 0
PHOTOPHOBIA: 0
BLURRED VISION: 0
SHORTNESS OF BREATH: 0
GASTROINTESTINAL NEGATIVE: 1
BACK PAIN: 0
DIARRHEA: 0
WHEEZING: 0

## 2018-10-01 NOTE — PROGRESS NOTES
Conjunctivae and EOM are normal.   Neck: Normal range of motion. Neck supple. No thyromegaly present. Cardiovascular: Normal rate, regular rhythm and normal heart sounds. No murmur heard. Pulmonary/Chest: Effort normal and breath sounds normal. She has no wheezes. She exhibits no tenderness. Abdominal: Soft. Bowel sounds are normal. There is no tenderness. Musculoskeletal: Normal range of motion. She exhibits no edema or tenderness. Lymphadenopathy:     She has no cervical adenopathy. Neurological: She is alert and oriented to person, place, and time. She has normal reflexes. Coordination normal.   Skin: Skin is warm and dry. No rash noted. Psychiatric: She has a normal mood and affect. Thought content normal.   Nursing note and vitals reviewed. Assessment:      Diagnosis Orders   1. Benign essential HTN  diltiazem (CARTIA XT) 180 MG extended release capsule   2. SSS (sick sinus syndrome) ([facility] [address]     3. Presence of cardiac pacemaker     4. Colon cancer screening  POCT Fecal Immunochemical Test (Fit)   5. Screening mammogram, encounter for  VARUN DIGITAL SCREEN W CAD BILATERAL       Plan:      Orders Placed This Encounter   Procedures    VARUN DIGITAL SCREEN W CAD BILATERAL     Standing Status:   Future     Standing Expiration Date:   10/1/2019    POCT Fecal Immunochemical Test (Fit)     Standing Status:   Future     Standing Expiration Date:   10/1/2019     Orders Placed This Encounter   Medications    diltiazem (CARTIA XT) 180 MG extended release capsule     Sig: Take 1 capsule by mouth daily     Dispense:  90 capsule     Refill:  1       Controlled Substances Monitoring:      Return in about 4 months (around 2/1/2019) for Routine follow up, Review of [address] [place] [address]. NATI Abbasi, CMA  , am scribing for and in the presence of Lina Bryant DO.  Electronically signed by :  NATI Damian, [facility], Lina Bryant DO, personally performed the services described in this documentation, as scribed by David Bell in my presence, and it is both accurate and complete.  Electronically signed by: Luisa Ribeiro DO    10/1/18 9:52 PM    Luisa Ribeiro DO

## 2018-10-09 ENCOUNTER — TELEPHONE (OUTPATIENT)
Dept: PRIMARY CARE CLINIC | Age: 73
End: 2018-10-09

## 2018-10-09 DIAGNOSIS — I10 BENIGN ESSENTIAL HTN: ICD-10-CM

## 2018-10-09 RX ORDER — LOSARTAN POTASSIUM 100 MG/1
TABLET ORAL
Qty: 90 TABLET | Refills: 1 | Status: SHIPPED | OUTPATIENT
Start: 2018-10-09

## 2018-10-09 NOTE — TELEPHONE ENCOUNTER
Patient is wanting to know if you wanted her to take losartan? She is questioning it because at her appointment the losartan was not refilled.  Please advise

## 2018-10-15 ENCOUNTER — HOSPITAL ENCOUNTER (OUTPATIENT)
Dept: WOMENS IMAGING | Age: 73
Discharge: HOME | End: 2018-10-17
Payer: MEDICARE

## 2018-10-15 DIAGNOSIS — Z12.31 SCREENING MAMMOGRAM, ENCOUNTER FOR: ICD-10-CM

## 2018-10-15 PROCEDURE — 77067 SCR MAMMO BI INCL CAD: CPT

## 2019-01-04 ENCOUNTER — TELEPHONE (OUTPATIENT)
Dept: PRIMARY CARE CLINIC | Age: 74
End: 2019-01-04

## 2019-01-04 DIAGNOSIS — Z12.11 COLON CANCER SCREENING: ICD-10-CM

## 2019-01-04 DIAGNOSIS — R19.5 POSITIVE FIT (FECAL IMMUNOCHEMICAL TEST): Primary | ICD-10-CM

## 2019-01-04 LAB
CONTROL: POSITIVE
HEMOCCULT STL QL: POSITIVE

## 2019-01-04 PROCEDURE — 82274 ASSAY TEST FOR BLOOD FECAL: CPT | Performed by: FAMILY MEDICINE

## 2019-03-27 ENCOUNTER — TELEPHONE (OUTPATIENT)
Dept: ADMINISTRATIVE | Age: 74
End: 2019-03-27

## 2019-10-21 ENCOUNTER — HOSPITAL ENCOUNTER (OUTPATIENT)
Dept: WOMENS IMAGING | Age: 74
Discharge: HOME | End: 2019-10-23
Payer: MEDICARE

## 2019-10-21 DIAGNOSIS — Z12.31 BREAST CANCER SCREENING BY MAMMOGRAM: ICD-10-CM

## 2019-10-21 PROCEDURE — 77067 SCR MAMMO BI INCL CAD: CPT

## 2023-03-29 DIAGNOSIS — I10 ESSENTIAL HYPERTENSION: ICD-10-CM

## 2023-03-29 RX ORDER — SOTALOL HYDROCHLORIDE 80 MG/1
TABLET ORAL
Qty: 60 TABLET | Refills: 3 | Status: SHIPPED | OUTPATIENT
Start: 2023-03-29 | End: 2023-07-10

## 2023-04-06 DIAGNOSIS — I10 ESSENTIAL HYPERTENSION: ICD-10-CM

## 2023-04-06 RX ORDER — LOSARTAN POTASSIUM 100 MG/1
TABLET ORAL
Qty: 90 TABLET | Refills: 1 | Status: SHIPPED | OUTPATIENT
Start: 2023-04-06 | End: 2023-04-08 | Stop reason: SDUPTHER

## 2023-04-06 RX ORDER — DILTIAZEM HYDROCHLORIDE 360 MG/1
CAPSULE, EXTENDED RELEASE ORAL
Qty: 90 CAPSULE | Refills: 1 | Status: SHIPPED | OUTPATIENT
Start: 2023-04-06 | End: 2023-04-08 | Stop reason: SDUPTHER

## 2023-04-08 DIAGNOSIS — I10 ESSENTIAL HYPERTENSION: ICD-10-CM

## 2023-04-09 RX ORDER — DILTIAZEM HYDROCHLORIDE 360 MG/1
360 CAPSULE, EXTENDED RELEASE ORAL
Qty: 90 CAPSULE | Refills: 0 | Status: SHIPPED | OUTPATIENT
Start: 2023-04-09 | End: 2023-09-25 | Stop reason: SDUPTHER

## 2023-04-09 RX ORDER — LOSARTAN POTASSIUM 100 MG/1
100 TABLET ORAL DAILY
Qty: 90 TABLET | Refills: 0 | Status: SHIPPED | OUTPATIENT
Start: 2023-04-09 | End: 2023-09-25 | Stop reason: SDUPTHER

## 2023-05-13 ENCOUNTER — OFFICE VISIT (OUTPATIENT)
Dept: PRIMARY CARE | Facility: CLINIC | Age: 78
End: 2023-05-13
Payer: MEDICARE

## 2023-05-13 VITALS
TEMPERATURE: 98 F | WEIGHT: 213.4 LBS | OXYGEN SATURATION: 96 % | HEART RATE: 64 BPM | HEIGHT: 68 IN | SYSTOLIC BLOOD PRESSURE: 114 MMHG | BODY MASS INDEX: 32.34 KG/M2 | RESPIRATION RATE: 16 BRPM | DIASTOLIC BLOOD PRESSURE: 76 MMHG

## 2023-05-13 DIAGNOSIS — J34.89 NASAL CONGESTION WITH RHINORRHEA: ICD-10-CM

## 2023-05-13 DIAGNOSIS — J02.9 SORE THROAT: ICD-10-CM

## 2023-05-13 DIAGNOSIS — R05.9 COUGH IN ADULT PATIENT: ICD-10-CM

## 2023-05-13 DIAGNOSIS — J00 NASOPHARYNGITIS: Primary | ICD-10-CM

## 2023-05-13 DIAGNOSIS — R09.81 NASAL CONGESTION WITH RHINORRHEA: ICD-10-CM

## 2023-05-13 LAB — POC RAPID STREP: NEGATIVE

## 2023-05-13 PROCEDURE — 99213 OFFICE O/P EST LOW 20 MIN: CPT | Performed by: NURSE PRACTITIONER

## 2023-05-13 PROCEDURE — 87081 CULTURE SCREEN ONLY: CPT

## 2023-05-13 PROCEDURE — 87880 STREP A ASSAY W/OPTIC: CPT | Performed by: NURSE PRACTITIONER

## 2023-05-13 RX ORDER — AZITHROMYCIN 500 MG/1
500 TABLET, FILM COATED ORAL DAILY
Qty: 7 TABLET | Refills: 0 | Status: SHIPPED | OUTPATIENT
Start: 2023-05-13 | End: 2023-05-20

## 2023-05-13 RX ORDER — BROMPHENIRAMINE MALEATE, PSEUDOEPHEDRINE HYDROCHLORIDE, AND DEXTROMETHORPHAN HYDROBROMIDE 2; 30; 10 MG/5ML; MG/5ML; MG/5ML
5 SYRUP ORAL 4 TIMES DAILY PRN
Qty: 120 ML | Refills: 2 | Status: SHIPPED | OUTPATIENT
Start: 2023-05-13 | End: 2023-05-23

## 2023-05-13 ASSESSMENT — ENCOUNTER SYMPTOMS
OCCASIONAL FEELINGS OF UNSTEADINESS: 0
DEPRESSION: 0
LOSS OF SENSATION IN FEET: 0

## 2023-05-13 NOTE — PROGRESS NOTES
Subjective   Patient ID: Daisy Borja is a 77 y.o. female who is with a chief complaint of symptoms of respiratory tract infection.     HPI  Patient is a 77 y.o. female who CONSULTED AT [facility] today. Patient is with complaints of sore throat, painful swallowing, cough, fatigue, and hoarseness. She denies having nasal congestion, nasal discharge, headache / sinus pain, muscle ache, loss of sense of taste, loss of sense of smell, diarrhea, chills nor fever.  Patient states that present condition started about 4 days ago. Patient denies history of recent travel, exposure to person/people who tested positive for COVID 19, nor exposure to person/people with flu like symptoms.  she denies shortness of breath, chest pain, palpitations, nor edema. she stated that she  tried OTC medications which afforded only slight relief of symptoms. she denies nausea, vomiting, abdominal pain, nor any other symptoms.    Patient states she had not received any COVID vaccine yet.  Patient states she have not yet received flu shot for this season.  Patient states that she opted to have no COVID test requested at this time. she states that she will do COVID test by [facility] depending on symptoms progression.    Review of Systems  General: no weight loss, generally healthy, (+) fatigue  Head:  no headaches / sinus pain, no vertigo, no injury  Eyes: no diplopia, no tearing, no pain,   Ears: no change in hearing, no tinnitus, no bleeding, no vertigo  Mouth:  no dental difficulties, no gingival bleeding, (+) sore throat, no loss of sense of taste, (+) painful swallowing, (+) hoarseness  Nose: no congestion, no  discharge, no bleeding, no obstruction, no loss of sense of smell  Neck: no stiffness, no pain, no tenderness, no masses, no bruit  Pulmonary: no dyspnea, no wheezing, no hemoptysis, (+) cough  Cardiovascular: no chest pain, no palpitations, no syncope, no orthopnea  Gastrointestinal: no change in appetite, no  dysphagia, no abdominal pains, no diarrhea, no emesis, no melena  Genito Urinary: no dysuria, no urinary urgency, no nocturia, no incontinence, no change in nature of urine  Musculoskeletal: no muscle ache, no joint pain, no limitation of range of motion, no paresthesia, no numbness  Constitutional: no fever, no chills, no night sweats    Objective   Physical Exam  General: ambulatory, in no acute distress  Head: normocephalic, no lesions, (+) sinus tenderness  Eyes: pink palpebral conjunctiva, anicteric sclerae, PERRLA, EOM's full  Ears: clear external auditory canals, no ear discharge, no bleeding from the ears, tympanic membrane intact  Nose: (+) congested nasal mucosa, (+) yellow mucoid nasal discharge, no bleeding, no obstruction  Throat: (+) erythema, and (+) exudate on posterior pharyngeal wall, no lesion, (+) erythema and enlargement of both tonsils  Neck: supple, no masses, no bruits, no CLADP  Chest: symmetrical chest expansion, no lagging, no retractions, clear breath sounds, no rales, no wheezes    Assessment/Plan   Problem List Items Addressed This Visit    None  Visit Diagnoses       Nasopharyngitis    -  Primary    Relevant Medications    azithromycin (Zithromax) 500 mg tablet    brompheniramine-pseudoeph-DM 2-30-10 mg/5 mL syrup    Sore throat        Relevant Medications    azithromycin (Zithromax) 500 mg tablet    Other Relevant Orders    POCT rapid strep A manually resulted (Completed)    Group A Streptococcus, Culture    Cough in adult patient        Relevant Medications    azithromycin (Zithromax) 500 mg tablet    brompheniramine-pseudoeph-DM 2-30-10 mg/5 mL syrup    Nasal congestion with rhinorrhea        Relevant Medications    azithromycin (Zithromax) 500 mg tablet        rapid strep test done  negative result discussed and explained to the patient  culture and sensitivity study of throat swab ordered and done  will call patient with result    DISCHARGE SUMMARY:   Patient was seen and  examined. Diagnosis, treatment, treatment options, and possible complications of today's illness discussed and explained to patient. Patient to take medication/s associated with this visit. Patient may also take OTC analgesic/antipyretic if needed for pain/fever. Advised to increase oral fluid intake. Advised steam inhalation if needed to relieve congestion. Advised warm saline gargle if needed to relieve throat discomfort. Advised Listerine antiseptic mouthwash gargle TID. Patient may use Cepacol oral spray as needed to relieve throat discomfort. Patient was advised to discard the old toothbrush and use a new toothbrush beginning on the third of antibiotics. Advised to come back if with worsening or persistent symptoms. Patient verbalized understanding of plan of care.    Patient to come back in 7 - 10 days if needed for worsening symptoms.

## 2023-05-16 LAB — GROUP A STREP SCREEN, CULTURE: NORMAL

## 2023-07-10 DIAGNOSIS — I10 ESSENTIAL HYPERTENSION: ICD-10-CM

## 2023-07-10 RX ORDER — SOTALOL HYDROCHLORIDE 80 MG/1
TABLET ORAL
Qty: 180 TABLET | Refills: 1 | Status: SHIPPED | OUTPATIENT
Start: 2023-07-10 | End: 2024-01-08

## 2023-08-29 ENCOUNTER — OFFICE VISIT (OUTPATIENT)
Dept: PRIMARY CARE | Facility: CLINIC | Age: 78
End: 2023-08-29
Payer: MEDICARE

## 2023-08-29 VITALS
SYSTOLIC BLOOD PRESSURE: 176 MMHG | HEART RATE: 63 BPM | DIASTOLIC BLOOD PRESSURE: 70 MMHG | HEIGHT: 68 IN | WEIGHT: 214.6 LBS | BODY MASS INDEX: 32.52 KG/M2 | OXYGEN SATURATION: 97 %

## 2023-08-29 DIAGNOSIS — G47.33 OBSTRUCTIVE SLEEP APNEA, ADULT: ICD-10-CM

## 2023-08-29 DIAGNOSIS — Z71.89 ACP (ADVANCE CARE PLANNING): ICD-10-CM

## 2023-08-29 DIAGNOSIS — R53.83 OTHER FATIGUE: ICD-10-CM

## 2023-08-29 DIAGNOSIS — E78.2 MIXED HYPERLIPIDEMIA: ICD-10-CM

## 2023-08-29 DIAGNOSIS — I10 ESSENTIAL HYPERTENSION: ICD-10-CM

## 2023-08-29 DIAGNOSIS — M25.562 LEFT KNEE PAIN, UNSPECIFIED CHRONICITY: ICD-10-CM

## 2023-08-29 DIAGNOSIS — Z00.00 ROUTINE GENERAL MEDICAL EXAMINATION AT HEALTH CARE FACILITY: ICD-10-CM

## 2023-08-29 DIAGNOSIS — I48.0 PAROXYSMAL ATRIAL FIBRILLATION (MULTI): ICD-10-CM

## 2023-08-29 DIAGNOSIS — E55.9 VITAMIN D DEFICIENCY: ICD-10-CM

## 2023-08-29 DIAGNOSIS — M25.472 EDEMA OF LEFT ANKLE: ICD-10-CM

## 2023-08-29 DIAGNOSIS — Z00.00 ENCOUNTER FOR MEDICARE ANNUAL WELLNESS EXAM: Primary | ICD-10-CM

## 2023-08-29 PROBLEM — R79.89 ELEVATED TSH: Status: ACTIVE | Noted: 2023-08-29

## 2023-08-29 PROBLEM — I49.3 PVC (PREMATURE VENTRICULAR CONTRACTION): Status: ACTIVE | Noted: 2023-08-29

## 2023-08-29 PROBLEM — E78.5 HYPERLIPIDEMIA: Status: ACTIVE | Noted: 2023-08-29

## 2023-08-29 PROBLEM — R60.9 EDEMA: Status: ACTIVE | Noted: 2023-08-29

## 2023-08-29 PROBLEM — T82.110A PACEMAKER LEAD FAILURE: Status: ACTIVE | Noted: 2023-08-29

## 2023-08-29 PROBLEM — R73.9 HYPERGLYCEMIA: Status: ACTIVE | Noted: 2023-08-29

## 2023-08-29 PROCEDURE — 99213 OFFICE O/P EST LOW 20 MIN: CPT | Performed by: FAMILY MEDICINE

## 2023-08-29 PROCEDURE — G0439 PPPS, SUBSEQ VISIT: HCPCS | Performed by: FAMILY MEDICINE

## 2023-08-29 RX ORDER — MELOXICAM 7.5 MG/1
7.5-15 TABLET ORAL DAILY
Qty: 60 TABLET | Refills: 0 | Status: SHIPPED | OUTPATIENT
Start: 2023-08-29 | End: 2023-12-26

## 2023-08-29 RX ORDER — FUROSEMIDE 20 MG/1
20 TABLET ORAL 2 TIMES DAILY
Qty: 60 TABLET | Refills: 1 | Status: SHIPPED | OUTPATIENT
Start: 2023-08-29 | End: 2024-05-03 | Stop reason: WASHOUT

## 2023-08-29 ASSESSMENT — ENCOUNTER SYMPTOMS
ABDOMINAL DISTENTION: 0
RECTAL PAIN: 0
SINUS PRESSURE: 0
PHOTOPHOBIA: 0
FLANK PAIN: 0
FEVER: 0
SINUS PAIN: 0
HEMATURIA: 0
ARTHRALGIAS: 0
HEADACHES: 0
ADENOPATHY: 0
POLYDIPSIA: 0
DYSURIA: 0
APPETITE CHANGE: 0
DIZZINESS: 0
RHINORRHEA: 0
SEIZURES: 0
ACTIVITY CHANGE: 0
FATIGUE: 0
DYSPHORIC MOOD: 0
MYALGIAS: 0
CONSTITUTIONAL NEGATIVE: 1
CONSTIPATION: 0
POLYPHAGIA: 0
SPEECH DIFFICULTY: 0
CONFUSION: 0
DECREASED CONCENTRATION: 0
DIARRHEA: 0
TROUBLE SWALLOWING: 0
STRIDOR: 0
SLEEP DISTURBANCE: 0
EYE PAIN: 0
BLOOD IN STOOL: 0
COUGH: 0
NECK STIFFNESS: 0
PALPITATIONS: 0
ABDOMINAL PAIN: 0
AGITATION: 0
CHEST TIGHTNESS: 0
SHORTNESS OF BREATH: 0
NERVOUS/ANXIOUS: 0
COLOR CHANGE: 0
SORE THROAT: 0

## 2023-08-29 ASSESSMENT — ACTIVITIES OF DAILY LIVING (ADL)
DRESSING: INDEPENDENT
MANAGING_FINANCES: INDEPENDENT
DOING_HOUSEWORK: INDEPENDENT
BATHING: INDEPENDENT
GROCERY_SHOPPING: INDEPENDENT
TAKING_MEDICATION: INDEPENDENT

## 2023-08-29 ASSESSMENT — PATIENT HEALTH QUESTIONNAIRE - PHQ9
1. LITTLE INTEREST OR PLEASURE IN DOING THINGS: NOT AT ALL
2. FEELING DOWN, DEPRESSED OR HOPELESS: NOT AT ALL
SUM OF ALL RESPONSES TO PHQ9 QUESTIONS 1 AND 2: 0

## 2023-08-29 NOTE — PROGRESS NOTES
212Subjective   Reason for Visit: Daisy Borja is an 77 y.o. female here for a Medicare Wellness visit.   Past Medical, Surgical, and Family History reviewed and updated in chart.     Patient is currently experiencing left knee pain for about a year, states she has noticed swelling and is having trouble going up and down stairs along with having trouble baring weight at times. Pain is aching and Advil relieves pain when she takes it.         Patient Care Team:  Rupert Balderrama DO as PCP - General  Rupert Balderrama DO as PCP - O Medicare Advantage PCP     Review of Systems   Constitutional: Negative.  Negative for activity change, appetite change, fatigue and fever.   HENT:  Negative for congestion, dental problem, ear discharge, ear pain, mouth sores, rhinorrhea, sinus pressure, sinus pain, sore throat, tinnitus and trouble swallowing.    Eyes:  Negative for photophobia, pain and visual disturbance.   Respiratory:  Negative for cough, chest tightness, shortness of breath and stridor.    Cardiovascular:  Negative for chest pain and palpitations.   Gastrointestinal:  Negative for abdominal distention, abdominal pain, blood in stool, constipation, diarrhea and rectal pain.   Endocrine: Negative for cold intolerance, heat intolerance, polydipsia, polyphagia and polyuria.   Genitourinary:  Negative for dysuria, flank pain, hematuria and urgency.   Musculoskeletal:  Negative for arthralgias, gait problem, myalgias and neck stiffness.   Skin:  Negative for color change and rash.   Allergic/Immunologic: Negative for environmental allergies and food allergies.   Neurological:  Negative for dizziness, seizures, syncope, speech difficulty and headaches.   Hematological:  Negative for adenopathy.   Psychiatric/Behavioral:  Negative for agitation, confusion, decreased concentration, dysphoric mood and sleep disturbance. The patient is not nervous/anxious.        Objective   Vitals:  /70   Pulse 63   Ht 1.727 m (5'  Date ofevaluation: 5/5/2019    Chief Complaint   Fall and Back Pain      Nursing Notes, Past Medical Hx, Past Surgical Hx, Social Hx, Allergies, and Family Hx were reviewed. History of Present Illness     Camilo Stokes is a 59 y.o. male who presents for evaluation of back pain following a fall. Patient describes a mechanical fall which was attempting to remove an air conditioning unit out of a window when it gave way and fell upon his stomach/chest and onto his back. He did not strike his head he did not lose consciousness. He has chronic low back pain with left-sided intermittent sciatic component but states that since his [place] has had persistent midline mid and low back pain that is constant and achy without radiation and now associated bilateral constant sciatica although he is adamantly denying any lower extremity weakness or numbness as well as any urinary retention or incontinence or bowel retention or incontinence. Patient has been seen for suffering similar in the past where he had a lumbar x-ray showing some spondylolisthesis of the lumbar spine but otherwise no acute fractures and was discharged home approximately 2 years ago. Patient states he's had chronic back pain for much longer than that. He feels that he has mild exacerbations admittedly throughout his last 2 decades of life. He does describe that this is more severe. He has no chest pain, short of breath, abdominal pain, nausea or vomiting, no fevers or chills.     Review of Systems     Review of Systems   10 point review of systems was performed at bedside and negative unless otherwise specified in HPI    Past Medical, Surgical, Family, and Social History         Diagnosis Date    Asthma     Back pain     Diabetes mellitus ([facility] [address]     Hypertension     Sciatica          Procedure Laterality Date    CYST REMOVAL      KNEE SURGERY      bilateral knee replacements    TOTAL KNEE ARTHROPLASTY      bilateral knees     His "8\")   Wt 97.3 kg (214 lb 9.6 oz)   SpO2 97%   BMI 32.63 kg/m²       Physical Exam  Vitals reviewed.   Constitutional:       General: She is not in acute distress.     Appearance: She is obese. She is not ill-appearing or diaphoretic.   HENT:      Head: Normocephalic.      Right Ear: Tympanic membrane and external ear normal.      Left Ear: Tympanic membrane and external ear normal.      Nose: Nose normal. No congestion.      Mouth/Throat:      Pharynx: No posterior oropharyngeal erythema.   Eyes:      General:         Right eye: No discharge.         Left eye: No discharge.      Extraocular Movements: Extraocular movements intact.      Conjunctiva/sclera: Conjunctivae normal.      Pupils: Pupils are equal, round, and reactive to light.   Cardiovascular:      Rate and Rhythm: Normal rate and regular rhythm.      Pulses: Normal pulses.      Heart sounds: Normal heart sounds. No murmur heard.  Pulmonary:      Effort: Pulmonary effort is normal. No respiratory distress.      Breath sounds: Normal breath sounds. No wheezing or rales.   Chest:      Chest wall: No tenderness.   Abdominal:      General: Bowel sounds are normal. There is distension.      Palpations: There is no mass.      Tenderness: There is no abdominal tenderness. There is no guarding.   Musculoskeletal:         General: No tenderness. Normal range of motion.      Cervical back: Normal range of motion and neck supple. No tenderness.      Right lower leg: No edema.      Left lower leg: No edema.   Skin:     General: Skin is warm and dry.      Coloration: Skin is not jaundiced.      Findings: No bruising or erythema.   Neurological:      General: No focal deficit present.      Mental Status: She is alert and oriented to person, place, and time. Mental status is at baseline.      Cranial Nerves: No cranial nerve deficit.      Sensory: No sensory deficit.      Coordination: Coordination normal.      Gait: Gait normal.   Psychiatric:         Mood and " family history is not on file. He reports that he has never smoked. He does not have any smokeless tobacco history on file. He reports that he does not drink alcohol or use drugs. Medications     Previous Medications    LOSARTAN (COZAAR) 50 MG TABLET    Take 50 mg by mouth    METFORMIN (GLUCOPHAGE) 500 MG TABLET    Take 500 mg by mouth 2 times daily (with meals). METOPROLOL (LOPRESSOR) 100 MG TABLET    Take 100 mg by mouth 2 times daily. Allergies     He has No Known Allergies. Physical Exam     INITIAL VITALS: BP (!) 158/111   Pulse 105   Temp 97.8 °F (36.6 °C) (Axillary)   Resp 16   SpO2 99%    Physical Exam   Constitutional: He is oriented to person, place, and time. He appears well-developed and well-nourished. HENT:   Head: Normocephalic and atraumatic. Eyes: Pupils are equal, round, and reactive to light. Neck: Normal range of motion. No midline cervical spine tenderness   Cardiovascular: Normal rate and regular rhythm. Pulmonary/Chest: Effort normal and breath sounds normal. No respiratory distress. Abdominal: Soft. Bowel sounds are normal.   Musculoskeletal: Normal range of motion. There is midline low thoracic and lumbar spine tenderness to palpation as well as paraspinal muscle tenderness to palpation. Neurological: He is alert and oriented to person, place, and time. 5/5 strength in bilateral upper and lower extremities. Sensation to light touch is intact in bilateral upper and lower extremities. Skin: Skin is warm and dry. Psychiatric: He has a normal mood and affect. His behavior is normal.   Nursing note and vitals reviewed. Diagnostic Results         RADIOLOGY:  XR LUMBAR SPINE (2-3 VIEWS)   Final Result      No sign of any acute fracture, however there is grade 1 spondylolisthesis of L4 in relation L5 and moderate disc space loss, likely secondary to moderate to severe facet hypertrophy at L4-5 and L5-S1 levels.  The listhesis is approximately 6 to 7 Affect: Mood normal.         Thought Content: Thought content normal.         Judgment: Judgment normal.         Assessment/Plan   Problem List Items Addressed This Visit       Essential hypertension    Relevant Orders    CBC and Auto Differential    Comprehensive Metabolic Panel    Hyperlipidemia    Relevant Orders    Lipid Panel    Obstructive sleep apnea, adult    Paroxysmal atrial fibrillation (CMS/HCC)    Vitamin D deficiency    Relevant Orders    Vitamin D 25-Hydroxy,Total     Other Visit Diagnoses       Encounter for Medicare annual wellness exam    -  Primary    Routine general medical examination at health care facility        ACP (advance care planning)        Left knee pain, unspecified chronicity        Relevant Medications    meloxicam (Mobic) 7.5 mg tablet    Other Relevant Orders    XR knee left 1-2 views (Completed)    Other fatigue        Relevant Orders    Thyroid Stimulating Hormone    Edema of left ankle        Relevant Medications    furosemide (Lasix) 20 mg tablet         Scribe Attestation  By signing my name below, I, Jaylin BOOTH , Scribe   attest that this documentation has been prepared under the direction and in the presence of Rupert Balderrama DO..  Provider Attestation - Scribe documentation  All medical record entries made by the Scribe were at my direction and personally dictated by me. I have reviewed the chart and agree that the record accurately reflects my personal performance of the history, physical exam, discussion and plan.          mm      THORACIC SPINE 2 views on 5/5/2019      HISTORY: Mid back pain after fall      COMPARISON: None. FINDINGS:      The vertebral bodies and disc spaces appear within normal limits. The alignment is normal. Mild spondylosis from T6 through T12      IMPRESSION:      No sign of any acute fracture deformity. There is normal alignment. Mild disc space loss and spondylosis are appreciated from T6 through the T12 level            XR THORACIC SPINE (3 VIEWS)   Final Result      No sign of any acute fracture, however there is grade 1 spondylolisthesis of L4 in relation L5 and moderate disc space loss, likely secondary to moderate to severe facet hypertrophy at L4-5 and L5-S1 levels. The listhesis is approximately 6 to 7 mm      THORACIC SPINE 2 views on 5/5/2019      HISTORY: Mid back pain after fall      COMPARISON: None. FINDINGS:      The vertebral bodies and disc spaces appear within normal limits. The alignment is normal. Mild spondylosis from T6 through T12      IMPRESSION:      No sign of any acute fracture deformity. There is normal alignment. Mild disc space loss and spondylosis are appreciated from T6 through the T12 level            CT LUMBAR SPINE WO CONTRAST    (Results Pending)         LABS:   Labs Reviewed - No data to display      RECENTVITALS:  BP: (!) 158/111, Temp: 97.8 °F (36.6 °C), Pulse: 105, Resp: 16     Procedures     none    ED Course     The patient was given the following medications:  Orders Placed This Encounter   Medications    ketorolac (TORADOL) injection 15 mg    cyclobenzaprine (FLEXERIL) tablet 10 mg    lidocaine 4 % external patch 1 patch            CONSULTS:  None    MEDICAL DECISION MAKING     Kaylie Greenfield is a 59 y.o. male who presents with low back pain and mid back pain following a fall with now new bilateral sciatica component and no new onset motor deficits or other red flag symptoms by history.     Thoracic spine x-ray did not show any acute pathology

## 2023-08-29 NOTE — PATIENT INSTRUCTIONS
Follow up in  to 4 weeks        Continue current medications and therapy for chronic medical conditions.    Patient was advised importance of proper diet/nutrition in addition adequate hydration. Patient was encouraged moderate exercise program to include 30 minutes daily for 5 days of the week or 150 minutes weekly. Patient will follow-up with us as scheduled.    Medicare annual wellness completed today     Xray left knee today     Labs from 3/9/2023 reviewed with patient     CBC, CMP, Lipid, TSH and Vit D ordered     Info on prevnar 20 given to patient     Start lasix 20mg BID     Take Meloxicam 7.5mg

## 2023-09-01 ENCOUNTER — APPOINTMENT (OUTPATIENT)
Dept: LAB | Facility: LAB | Age: 78
End: 2023-09-01
Payer: MEDICARE

## 2023-09-02 ENCOUNTER — LAB (OUTPATIENT)
Dept: LAB | Facility: LAB | Age: 78
End: 2023-09-02
Payer: MEDICARE

## 2023-09-02 DIAGNOSIS — E78.2 MIXED HYPERLIPIDEMIA: ICD-10-CM

## 2023-09-02 DIAGNOSIS — I10 ESSENTIAL HYPERTENSION: ICD-10-CM

## 2023-09-02 DIAGNOSIS — R53.83 OTHER FATIGUE: ICD-10-CM

## 2023-09-02 DIAGNOSIS — E55.9 VITAMIN D DEFICIENCY: ICD-10-CM

## 2023-09-02 LAB
ALANINE AMINOTRANSFERASE (SGPT) (U/L) IN SER/PLAS: 19 U/L (ref 7–45)
ALBUMIN (G/DL) IN SER/PLAS: 4.3 G/DL (ref 3.4–5)
ALKALINE PHOSPHATASE (U/L) IN SER/PLAS: 83 U/L (ref 33–136)
ANION GAP IN SER/PLAS: 10 MMOL/L (ref 10–20)
ASPARTATE AMINOTRANSFERASE (SGOT) (U/L) IN SER/PLAS: 18 U/L (ref 9–39)
BASOPHILS (10*3/UL) IN BLOOD BY AUTOMATED COUNT: 0.04 X10E9/L (ref 0–0.1)
BASOPHILS/100 LEUKOCYTES IN BLOOD BY AUTOMATED COUNT: 0.6 % (ref 0–2)
BILIRUBIN TOTAL (MG/DL) IN SER/PLAS: 0.6 MG/DL (ref 0–1.2)
CALCIDIOL (25 OH VITAMIN D3) (NG/ML) IN SER/PLAS: 29 NG/ML
CALCIUM (MG/DL) IN SER/PLAS: 9.3 MG/DL (ref 8.6–10.3)
CARBON DIOXIDE, TOTAL (MMOL/L) IN SER/PLAS: 32 MMOL/L (ref 21–32)
CHLORIDE (MMOL/L) IN SER/PLAS: 105 MMOL/L (ref 98–107)
CHOLESTEROL (MG/DL) IN SER/PLAS: 179 MG/DL (ref 0–199)
CHOLESTEROL IN HDL (MG/DL) IN SER/PLAS: 45.6 MG/DL
CHOLESTEROL/HDL RATIO: 3.9
CREATININE (MG/DL) IN SER/PLAS: 0.86 MG/DL (ref 0.5–1.05)
EOSINOPHILS (10*3/UL) IN BLOOD BY AUTOMATED COUNT: 0.09 X10E9/L (ref 0–0.4)
EOSINOPHILS/100 LEUKOCYTES IN BLOOD BY AUTOMATED COUNT: 1.4 % (ref 0–6)
ERYTHROCYTE DISTRIBUTION WIDTH (RATIO) BY AUTOMATED COUNT: 12.6 % (ref 11.5–14.5)
ERYTHROCYTE MEAN CORPUSCULAR HEMOGLOBIN CONCENTRATION (G/DL) BY AUTOMATED: 33.3 G/DL (ref 32–36)
ERYTHROCYTE MEAN CORPUSCULAR VOLUME (FL) BY AUTOMATED COUNT: 93 FL (ref 80–100)
ERYTHROCYTES (10*6/UL) IN BLOOD BY AUTOMATED COUNT: 4.73 X10E12/L (ref 4–5.2)
GFR FEMALE: 69 ML/MIN/1.73M2
GLUCOSE (MG/DL) IN SER/PLAS: 119 MG/DL (ref 74–99)
HEMATOCRIT (%) IN BLOOD BY AUTOMATED COUNT: 44.2 % (ref 36–46)
HEMOGLOBIN (G/DL) IN BLOOD: 14.7 G/DL (ref 12–16)
IMMATURE GRANULOCYTES/100 LEUKOCYTES IN BLOOD BY AUTOMATED COUNT: 0.2 % (ref 0–0.9)
LDL: 112 MG/DL (ref 0–99)
LEUKOCYTES (10*3/UL) IN BLOOD BY AUTOMATED COUNT: 6.2 X10E9/L (ref 4.4–11.3)
LYMPHOCYTES (10*3/UL) IN BLOOD BY AUTOMATED COUNT: 2.29 X10E9/L (ref 0.8–3)
LYMPHOCYTES/100 LEUKOCYTES IN BLOOD BY AUTOMATED COUNT: 36.9 % (ref 13–44)
MONOCYTES (10*3/UL) IN BLOOD BY AUTOMATED COUNT: 0.36 X10E9/L (ref 0.05–0.8)
MONOCYTES/100 LEUKOCYTES IN BLOOD BY AUTOMATED COUNT: 5.8 % (ref 2–10)
NEUTROPHILS (10*3/UL) IN BLOOD BY AUTOMATED COUNT: 3.42 X10E9/L (ref 1.6–5.5)
NEUTROPHILS/100 LEUKOCYTES IN BLOOD BY AUTOMATED COUNT: 55.1 % (ref 40–80)
PLATELETS (10*3/UL) IN BLOOD AUTOMATED COUNT: 249 X10E9/L (ref 150–450)
POTASSIUM (MMOL/L) IN SER/PLAS: 4.2 MMOL/L (ref 3.5–5.3)
PROTEIN TOTAL: 6.5 G/DL (ref 6.4–8.2)
SODIUM (MMOL/L) IN SER/PLAS: 143 MMOL/L (ref 136–145)
THYROTROPIN (MIU/L) IN SER/PLAS BY DETECTION LIMIT <= 0.05 MIU/L: 1.89 MIU/L (ref 0.44–3.98)
TRIGLYCERIDE (MG/DL) IN SER/PLAS: 107 MG/DL (ref 0–149)
UREA NITROGEN (MG/DL) IN SER/PLAS: 13 MG/DL (ref 6–23)
VLDL: 21 MG/DL (ref 0–40)

## 2023-09-02 PROCEDURE — 85025 COMPLETE CBC W/AUTO DIFF WBC: CPT

## 2023-09-02 PROCEDURE — 82306 VITAMIN D 25 HYDROXY: CPT

## 2023-09-02 PROCEDURE — 84443 ASSAY THYROID STIM HORMONE: CPT

## 2023-09-02 PROCEDURE — 80061 LIPID PANEL: CPT

## 2023-09-02 PROCEDURE — 36415 COLL VENOUS BLD VENIPUNCTURE: CPT

## 2023-09-02 PROCEDURE — 80053 COMPREHEN METABOLIC PANEL: CPT

## 2023-09-20 ENCOUNTER — APPOINTMENT (OUTPATIENT)
Dept: PRIMARY CARE | Facility: CLINIC | Age: 78
End: 2023-09-20
Payer: MEDICARE

## 2023-09-25 ENCOUNTER — OFFICE VISIT (OUTPATIENT)
Dept: PRIMARY CARE | Facility: CLINIC | Age: 78
End: 2023-09-25
Payer: MEDICARE

## 2023-09-25 VITALS
WEIGHT: 214.8 LBS | BODY MASS INDEX: 32.55 KG/M2 | RESPIRATION RATE: 16 BRPM | SYSTOLIC BLOOD PRESSURE: 136 MMHG | DIASTOLIC BLOOD PRESSURE: 90 MMHG | HEIGHT: 68 IN | OXYGEN SATURATION: 96 % | HEART RATE: 60 BPM

## 2023-09-25 DIAGNOSIS — M25.562 ACUTE PAIN OF LEFT KNEE: ICD-10-CM

## 2023-09-25 DIAGNOSIS — I48.0 PAROXYSMAL ATRIAL FIBRILLATION (MULTI): ICD-10-CM

## 2023-09-25 DIAGNOSIS — R73.9 HYPERGLYCEMIA: ICD-10-CM

## 2023-09-25 DIAGNOSIS — E78.2 MIXED HYPERLIPIDEMIA: ICD-10-CM

## 2023-09-25 DIAGNOSIS — I10 ESSENTIAL HYPERTENSION: Primary | ICD-10-CM

## 2023-09-25 LAB
POC FINGERSTICK BLOOD GLUCOSE: 102 MG/DL (ref 70–100)
POC HEMOGLOBIN A1C: 6.2 % (ref 4.2–6.5)

## 2023-09-25 PROCEDURE — 99214 OFFICE O/P EST MOD 30 MIN: CPT | Performed by: FAMILY MEDICINE

## 2023-09-25 PROCEDURE — 82962 GLUCOSE BLOOD TEST: CPT | Performed by: FAMILY MEDICINE

## 2023-09-25 PROCEDURE — 83036 HEMOGLOBIN GLYCOSYLATED A1C: CPT | Performed by: FAMILY MEDICINE

## 2023-09-25 RX ORDER — FUROSEMIDE 20 MG/1
20 TABLET ORAL 2 TIMES DAILY
Qty: 180 TABLET | Refills: 1 | Status: SHIPPED | OUTPATIENT
Start: 2023-09-25 | End: 2024-05-03 | Stop reason: WASHOUT

## 2023-09-25 RX ORDER — DILTIAZEM HYDROCHLORIDE 360 MG/1
360 CAPSULE, EXTENDED RELEASE ORAL
Qty: 90 CAPSULE | Refills: 1 | Status: SHIPPED | OUTPATIENT
Start: 2023-09-25 | End: 2023-12-21 | Stop reason: SDUPTHER

## 2023-09-25 RX ORDER — LOSARTAN POTASSIUM 100 MG/1
100 TABLET ORAL DAILY
Qty: 90 TABLET | Refills: 1 | Status: SHIPPED | OUTPATIENT
Start: 2023-09-25 | End: 2023-12-21 | Stop reason: SDUPTHER

## 2023-09-25 RX ORDER — HYDROCHLOROTHIAZIDE 12.5 MG/1
12.5 TABLET ORAL DAILY
Qty: 30 TABLET | Refills: 0 | Status: CANCELLED | OUTPATIENT
Start: 2023-09-25 | End: 2023-10-25

## 2023-09-25 ASSESSMENT — ENCOUNTER SYMPTOMS
FLANK PAIN: 0
SHORTNESS OF BREATH: 0
EYE PAIN: 0
COUGH: 0
CONFUSION: 0
STRIDOR: 0
AGITATION: 0
FATIGUE: 0
ADENOPATHY: 0
DYSURIA: 0
SINUS PAIN: 0
NECK STIFFNESS: 0
SLEEP DISTURBANCE: 0
APPETITE CHANGE: 0
ACTIVITY CHANGE: 0
ABDOMINAL PAIN: 0
HEMATURIA: 0
MYALGIAS: 0
DIARRHEA: 0
CONSTIPATION: 0
PHOTOPHOBIA: 0
RECTAL PAIN: 0
DYSPHORIC MOOD: 0
SINUS PRESSURE: 0
PALPITATIONS: 0
TROUBLE SWALLOWING: 0
DIZZINESS: 0
RHINORRHEA: 0
DECREASED CONCENTRATION: 0
CONSTITUTIONAL NEGATIVE: 1
NERVOUS/ANXIOUS: 0
COLOR CHANGE: 0
SPEECH DIFFICULTY: 0
POLYDIPSIA: 0
HEADACHES: 0
ARTHRALGIAS: 0
ABDOMINAL DISTENTION: 0
FEVER: 0
CHEST TIGHTNESS: 0
SEIZURES: 0
POLYPHAGIA: 0
SORE THROAT: 0
BLOOD IN STOOL: 0

## 2023-09-25 NOTE — PROGRESS NOTES
"Subjective   Patient ID: Daisy Borja is a 77 y.o. female who presents for Knee Pain.    Knee Pain      Patient is following up on left knee pain. Patient is feeling better and still gets a twinge of pain at times. She is walking better.     Patient had blood work done on 9/2/23 to review.   Patient refuses flu shot today.    Review of Systems   Constitutional: Negative.  Negative for activity change, appetite change, fatigue and fever.   HENT:  Negative for congestion, dental problem, ear discharge, ear pain, mouth sores, rhinorrhea, sinus pressure, sinus pain, sore throat, tinnitus and trouble swallowing.    Eyes:  Negative for photophobia, pain and visual disturbance.   Respiratory:  Negative for cough, chest tightness, shortness of breath and stridor.    Cardiovascular:  Negative for chest pain and palpitations.   Gastrointestinal:  Negative for abdominal distention, abdominal pain, blood in stool, constipation, diarrhea and rectal pain.   Endocrine: Negative for cold intolerance, heat intolerance, polydipsia, polyphagia and polyuria.   Genitourinary:  Negative for dysuria, flank pain, hematuria and urgency.   Musculoskeletal:  Negative for arthralgias, gait problem, myalgias and neck stiffness.   Skin:  Negative for color change and rash.   Allergic/Immunologic: Negative for environmental allergies and food allergies.   Neurological:  Negative for dizziness, seizures, syncope, speech difficulty and headaches.   Hematological:  Negative for adenopathy.   Psychiatric/Behavioral:  Negative for agitation, confusion, decreased concentration, dysphoric mood and sleep disturbance. The patient is not nervous/anxious.        Objective   /90 (BP Location: Right arm, Patient Position: Sitting, BP Cuff Size: Adult)   Pulse 60   Resp 16   Ht 1.727 m (5' 8\")   Wt 97.4 kg (214 lb 12.8 oz)   SpO2 96%   BMI 32.66 kg/m²     Physical Exam  Vitals reviewed.   Constitutional:       General: She is not in acute distress.   "   Appearance: She is obese. She is not ill-appearing or diaphoretic.   HENT:      Head: Normocephalic.      Right Ear: Tympanic membrane and external ear normal.      Left Ear: Tympanic membrane and external ear normal.      Nose: Nose normal. No congestion.      Mouth/Throat:      Pharynx: No posterior oropharyngeal erythema.   Eyes:      General:         Right eye: No discharge.         Left eye: No discharge.      Extraocular Movements: Extraocular movements intact.      Conjunctiva/sclera: Conjunctivae normal.      Pupils: Pupils are equal, round, and reactive to light.   Cardiovascular:      Rate and Rhythm: Normal rate and regular rhythm.      Pulses: Normal pulses.      Heart sounds: Normal heart sounds. No murmur heard.  Pulmonary:      Effort: Pulmonary effort is normal. No respiratory distress.      Breath sounds: Normal breath sounds. No wheezing or rales.   Chest:      Chest wall: No tenderness.   Abdominal:      General: Bowel sounds are normal. There is distension.      Palpations: There is no mass.      Tenderness: There is no abdominal tenderness. There is no guarding.   Musculoskeletal:         General: Tenderness present. Normal range of motion.      Cervical back: Normal range of motion and neck supple. No tenderness.      Right lower leg: No edema.      Left lower leg: No edema.      Comments: Moderate tenderness left knee without joint effusion.  Good flexion extension of the knee joint   Skin:     General: Skin is dry.      Coloration: Skin is not jaundiced.      Findings: No bruising, erythema or rash.   Neurological:      General: No focal deficit present.      Mental Status: She is alert and oriented to person, place, and time. Mental status is at baseline.      Cranial Nerves: No cranial nerve deficit.      Sensory: No sensory deficit.      Coordination: Coordination normal.      Gait: Gait normal.   Psychiatric:         Mood and Affect: Mood normal.         Thought Content: Thought content  normal.         Judgment: Judgment normal.         Assessment/Plan   Problem List Items Addressed This Visit       Essential hypertension - Primary    Relevant Medications    dilTIAZem ER (Tiazac) 360 mg 24 hr capsule    losartan (Cozaar) 100 mg tablet    furosemide (Lasix) 20 mg tablet    Hyperglycemia    Relevant Orders    POCT fingerstick glucose manually resulted (Completed)    POCT glycosylated hemoglobin (Hb A1C) manually resulted (Completed)    Hyperlipidemia    Relevant Orders    Comprehensive Metabolic Panel    Lipid Panel    Paroxysmal atrial fibrillation (CMS/HCC)    Relevant Medications    dilTIAZem ER (Tiazac) 360 mg 24 hr capsule     Other Visit Diagnoses       Acute pain of left knee                  Scribe Attestation  By signing my name below, IMaria M RMA , Farhadibnestor   attest that this documentation has been prepared under the direction and in the presence of Rupert Balderrama DO.   Provider Attestation - Scribe documentation    All medical record entries made by the Scribe were at my direction and personally dictated by me. I have reviewed the chart and agree that the record accurately reflects my personal performance of the history, physical exam, discussion and plan.

## 2023-09-25 NOTE — PATIENT INSTRUCTIONS
Follow up in    Continue current medications and therapy for chronic medical conditions.    Patient was advised importance of proper diet/nutrition in addition adequate hydration. Patient was encouraged moderate exercise program to include 30 minutes daily for 5 days of the week or 150 minutes weekly. Patient will follow-up with us as scheduled.    OBTAIN FASTIN LIPID AND CMP IN 3 MONTHS     IO A1C AND GLUCOSE TODAY     START HYDROCHLOROTHIAZIDE 12.5MG ONCE DAILY

## 2023-10-19 ENCOUNTER — OFFICE VISIT (OUTPATIENT)
Dept: PRIMARY CARE | Facility: CLINIC | Age: 78
End: 2023-10-19
Payer: MEDICARE

## 2023-10-19 VITALS
DIASTOLIC BLOOD PRESSURE: 88 MMHG | RESPIRATION RATE: 15 BRPM | BODY MASS INDEX: 32.28 KG/M2 | HEART RATE: 64 BPM | OXYGEN SATURATION: 97 % | HEIGHT: 68 IN | SYSTOLIC BLOOD PRESSURE: 160 MMHG | WEIGHT: 213 LBS

## 2023-10-19 DIAGNOSIS — R73.9 HYPERGLYCEMIA: ICD-10-CM

## 2023-10-19 DIAGNOSIS — I10 ESSENTIAL HYPERTENSION: Primary | ICD-10-CM

## 2023-10-19 DIAGNOSIS — E78.2 MIXED HYPERLIPIDEMIA: ICD-10-CM

## 2023-10-19 DIAGNOSIS — G47.33 OBSTRUCTIVE SLEEP APNEA, ADULT: ICD-10-CM

## 2023-10-19 DIAGNOSIS — I48.0 PAROXYSMAL ATRIAL FIBRILLATION (MULTI): ICD-10-CM

## 2023-10-19 PROCEDURE — 99214 OFFICE O/P EST MOD 30 MIN: CPT | Performed by: FAMILY MEDICINE

## 2023-10-19 RX ORDER — HYDRALAZINE HYDROCHLORIDE 50 MG/1
50 TABLET, FILM COATED ORAL 2 TIMES DAILY
Qty: 60 TABLET | Refills: 2 | Status: SHIPPED | OUTPATIENT
Start: 2023-10-19 | End: 2024-01-17

## 2023-10-19 ASSESSMENT — ENCOUNTER SYMPTOMS
STRIDOR: 0
COUGH: 0
HEMATURIA: 0
POLYDIPSIA: 0
CONFUSION: 0
AGITATION: 0
CONSTIPATION: 0
PALPITATIONS: 0
HYPERTENSION: 1
SPEECH DIFFICULTY: 0
HEADACHES: 0
NECK STIFFNESS: 0
EYE PAIN: 0
MYALGIAS: 0
DECREASED CONCENTRATION: 0
DIZZINESS: 0
BLOOD IN STOOL: 0
SHORTNESS OF BREATH: 0
DYSPHORIC MOOD: 0
CHEST TIGHTNESS: 0
POLYPHAGIA: 0
NERVOUS/ANXIOUS: 0
RHINORRHEA: 0
ACTIVITY CHANGE: 0
ARTHRALGIAS: 0
COLOR CHANGE: 0
ABDOMINAL DISTENTION: 0
PHOTOPHOBIA: 0
RECTAL PAIN: 0
SLEEP DISTURBANCE: 0
DIARRHEA: 0
FLANK PAIN: 0
SORE THROAT: 0
DYSURIA: 0
SINUS PAIN: 0
SINUS PRESSURE: 0
SEIZURES: 0
FEVER: 0
APPETITE CHANGE: 0
CONSTITUTIONAL NEGATIVE: 1
ABDOMINAL PAIN: 0
ADENOPATHY: 0
FATIGUE: 0
TROUBLE SWALLOWING: 0

## 2023-10-19 NOTE — PROGRESS NOTES
Subjective   Patient ID: Daisy Borja is a 77 y.o. female who presents for Hypertension.    Patient is here for 3 weeks follow up on hypertension.    Patient states that will have cataract surgery in two weeks and want to check up her vital before. Patient would like to know if everything is good for surgery.    Patient would like to know how long she have to take Mobic 7.5 mg.    Patient denies have any other symptoms or concerns today.    Hypertension  This is a recurrent problem. The current episode started more than 1 year ago. The problem is unchanged. Pertinent negatives include no chest pain, headaches, palpitations or shortness of breath. There are no associated agents to hypertension. There are no known risk factors for coronary artery disease.        Review of Systems   Constitutional: Negative.  Negative for activity change, appetite change, fatigue and fever.   HENT:  Negative for congestion, dental problem, ear discharge, ear pain, mouth sores, rhinorrhea, sinus pressure, sinus pain, sore throat, tinnitus and trouble swallowing.    Eyes:  Negative for photophobia, pain and visual disturbance.   Respiratory:  Negative for cough, chest tightness, shortness of breath and stridor.    Cardiovascular:  Negative for chest pain and palpitations.   Gastrointestinal:  Negative for abdominal distention, abdominal pain, blood in stool, constipation, diarrhea and rectal pain.   Endocrine: Negative for cold intolerance, heat intolerance, polydipsia, polyphagia and polyuria.   Genitourinary:  Negative for dysuria, flank pain, hematuria and urgency.   Musculoskeletal:  Negative for arthralgias, gait problem, myalgias and neck stiffness.   Skin:  Negative for color change and rash.   Allergic/Immunologic: Negative for environmental allergies and food allergies.   Neurological:  Negative for dizziness, seizures, syncope, speech difficulty and headaches.   Hematological:  Negative for adenopathy.   Psychiatric/Behavioral:   "Negative for agitation, confusion, decreased concentration, dysphoric mood and sleep disturbance. The patient is not nervous/anxious.        Objective   /88 (BP Location: Right arm, Patient Position: Sitting, BP Cuff Size: Adult)   Pulse 64   Resp 15   Ht 1.727 m (5' 8\")   Wt 96.6 kg (213 lb)   SpO2 97%   BMI 32.39 kg/m²     Physical Exam  Vitals reviewed.   Constitutional:       General: She is not in acute distress.     Appearance: Normal appearance. She is normal weight. She is not ill-appearing or diaphoretic.   HENT:      Head: Normocephalic.      Right Ear: Tympanic membrane and external ear normal.      Left Ear: Tympanic membrane and external ear normal.      Nose: Nose normal. No congestion.      Mouth/Throat:      Pharynx: No posterior oropharyngeal erythema.   Eyes:      General:         Right eye: No discharge.         Left eye: No discharge.      Extraocular Movements: Extraocular movements intact.      Conjunctiva/sclera: Conjunctivae normal.      Pupils: Pupils are equal, round, and reactive to light.   Cardiovascular:      Rate and Rhythm: Normal rate and regular rhythm.      Pulses: Normal pulses.      Heart sounds: Normal heart sounds. No murmur heard.  Pulmonary:      Effort: Pulmonary effort is normal. No respiratory distress.      Breath sounds: Normal breath sounds. No wheezing or rales.   Chest:      Chest wall: No tenderness.   Abdominal:      General: Abdomen is flat. Bowel sounds are normal. There is no distension.      Palpations: There is no mass.      Tenderness: There is no abdominal tenderness. There is no guarding.   Musculoskeletal:         General: No tenderness. Normal range of motion.      Cervical back: Normal range of motion and neck supple. No tenderness.      Right lower leg: No edema.      Left lower leg: No edema.   Skin:     General: Skin is dry.      Coloration: Skin is not jaundiced.      Findings: No bruising, erythema or rash.   Neurological:      General: " No focal deficit present.      Mental Status: She is alert and oriented to person, place, and time. Mental status is at baseline.      Cranial Nerves: No cranial nerve deficit.      Sensory: No sensory deficit.      Coordination: Coordination normal.      Gait: Gait normal.   Psychiatric:         Mood and Affect: Mood normal.         Thought Content: Thought content normal.         Judgment: Judgment normal.         Assessment/Plan   Problem List Items Addressed This Visit             ICD-10-CM    Essential hypertension - Primary I10    Relevant Medications    hydrALAZINE (Apresoline) 50 mg tablet    Hyperglycemia R73.9    Hyperlipidemia E78.5    Obstructive sleep apnea, adult G47.33    Paroxysmal atrial fibrillation (CMS/[facility]) I48.0         Scribe Attestation  By signing my name below, I, EMMY Drake , Emmanuel   attest that this documentation has been prepared under the direction and in the presence of Rupert Balderrama DO.   Provider Attestation - Scribe documentation    All medical record entries made by the Scribe were at my direction and personally dictated by me. I have reviewed the chart and agree that the record accurately reflects my personal performance of the history, physical exam, discussion and plan.

## 2023-10-19 NOTE — PATIENT INSTRUCTIONS
Follow up in 3 months    Continue current medications and therapy for chronic medical conditions.    Patient was advised importance of proper diet/nutrition in addition adequate hydration. Patient was encouraged moderate exercise program to include 30 minutes daily for 5 days of the week or 150 minutes weekly. Patient will follow-up with us as scheduled.    Review lab results from September 2023    START HYDRALAZINE 50MG TWICE DAILY

## 2023-12-06 ENCOUNTER — HOSPITAL ENCOUNTER (OUTPATIENT)
Dept: CARDIOLOGY | Facility: HOSPITAL | Age: 78
Discharge: HOME | End: 2023-12-06
Payer: MEDICARE

## 2023-12-06 DIAGNOSIS — I49.5 SICK SINUS SYNDROME (MULTI): ICD-10-CM

## 2023-12-06 DIAGNOSIS — Z95.0 PACEMAKER: Primary | ICD-10-CM

## 2023-12-06 DIAGNOSIS — Z95.0 PACEMAKER: ICD-10-CM

## 2023-12-06 PROCEDURE — 93294 REM INTERROG EVL PM/LDLS PM: CPT | Performed by: INTERNAL MEDICINE

## 2023-12-06 PROCEDURE — 93296 REM INTERROG EVL PM/IDS: CPT

## 2023-12-07 ENCOUNTER — APPOINTMENT (OUTPATIENT)
Dept: PRIMARY CARE | Facility: CLINIC | Age: 78
End: 2023-12-07
Payer: MEDICARE

## 2023-12-21 ENCOUNTER — OFFICE VISIT (OUTPATIENT)
Dept: PRIMARY CARE | Facility: CLINIC | Age: 78
End: 2023-12-21
Payer: MEDICARE

## 2023-12-21 VITALS
HEART RATE: 72 BPM | OXYGEN SATURATION: 96 % | TEMPERATURE: 97.2 F | WEIGHT: 217 LBS | HEIGHT: 68 IN | RESPIRATION RATE: 15 BRPM | SYSTOLIC BLOOD PRESSURE: 190 MMHG | DIASTOLIC BLOOD PRESSURE: 88 MMHG | BODY MASS INDEX: 32.89 KG/M2

## 2023-12-21 DIAGNOSIS — E78.2 MIXED HYPERLIPIDEMIA: ICD-10-CM

## 2023-12-21 DIAGNOSIS — I10 ESSENTIAL HYPERTENSION: Primary | ICD-10-CM

## 2023-12-21 DIAGNOSIS — G47.33 OBSTRUCTIVE SLEEP APNEA, ADULT: ICD-10-CM

## 2023-12-21 PROCEDURE — 99214 OFFICE O/P EST MOD 30 MIN: CPT | Performed by: FAMILY MEDICINE

## 2023-12-21 RX ORDER — HYDRALAZINE HYDROCHLORIDE 100 MG/1
100 TABLET, FILM COATED ORAL 3 TIMES DAILY
Qty: 90 TABLET | Refills: 1 | Status: SHIPPED | OUTPATIENT
Start: 2023-12-21 | End: 2024-03-07

## 2023-12-21 RX ORDER — LOSARTAN POTASSIUM 100 MG/1
100 TABLET ORAL DAILY
Qty: 90 TABLET | Refills: 1 | Status: SHIPPED | OUTPATIENT
Start: 2023-12-21 | End: 2024-04-02

## 2023-12-21 RX ORDER — HYDROCHLOROTHIAZIDE 25 MG/1
25 TABLET ORAL DAILY
Qty: 30 TABLET | Refills: 2 | Status: SHIPPED | OUTPATIENT
Start: 2023-12-21 | End: 2024-04-12

## 2023-12-21 RX ORDER — DILTIAZEM HYDROCHLORIDE 360 MG/1
360 CAPSULE, EXTENDED RELEASE ORAL
Qty: 90 CAPSULE | Refills: 1 | Status: SHIPPED | OUTPATIENT
Start: 2023-12-21

## 2023-12-21 ASSESSMENT — ENCOUNTER SYMPTOMS
DIZZINESS: 0
TROUBLE SWALLOWING: 0
POLYPHAGIA: 0
SLEEP DISTURBANCE: 0
DYSURIA: 0
DYSPHORIC MOOD: 0
DECREASED CONCENTRATION: 0
CHEST TIGHTNESS: 0
PHOTOPHOBIA: 0
CONSTIPATION: 0
SINUS PRESSURE: 0
RHINORRHEA: 0
ARTHRALGIAS: 0
FATIGUE: 0
COUGH: 0
POLYDIPSIA: 0
ABDOMINAL PAIN: 0
CONFUSION: 0
HEMATURIA: 0
STRIDOR: 0
ADENOPATHY: 0
EYE PAIN: 0
BLOOD IN STOOL: 0
NECK STIFFNESS: 0
SORE THROAT: 0
AGITATION: 0
SHORTNESS OF BREATH: 0
SINUS PAIN: 0
ABDOMINAL DISTENTION: 0
NERVOUS/ANXIOUS: 0
FEVER: 0
MYALGIAS: 0
ACTIVITY CHANGE: 0
CONSTITUTIONAL NEGATIVE: 1
DIARRHEA: 0
RECTAL PAIN: 0
HYPERTENSION: 1
FLANK PAIN: 0
PALPITATIONS: 0
COLOR CHANGE: 0
HEADACHES: 0
SEIZURES: 0
APPETITE CHANGE: 0
SPEECH DIFFICULTY: 0

## 2023-12-21 NOTE — PATIENT INSTRUCTIONS
Follow up in 2 weeks    Continue current medications and therapy for chronic medical conditions.    Patient was advised importance of proper diet/nutrition in addition adequate hydration. Patient was encouraged moderate exercise program to include 30 minutes daily for 5 days of the week or 150 minutes weekly. Patient will follow-up with us as scheduled.    START HYDROCHLOROTHIAZIDE 25MG ONCE DAILY     Patient has upcoming eye surgery and does need to have blood pressure under control.  She was encouraged low-salt diet.  Will follow-up with us as scheduled

## 2023-12-21 NOTE — PROGRESS NOTES
Subjective   Patient ID: Daisy Borja is a 78 y.o. female who presents for Hypertension.    Patient is here for follow up on hypertension.    Patient states would like have a prescription of meloxicam 7.5 mg for her knee pain. Patient states it help her a lot with the pain.    Patient denies any symptoms or concerns today.    Hypertension  This is a recurrent problem. The current episode started more than 1 year ago. The problem is unchanged. Pertinent negatives include no chest pain, headaches, palpitations or shortness of breath.        Review of Systems   Constitutional: Negative.  Negative for activity change, appetite change, fatigue and fever.   HENT:  Negative for congestion, dental problem, ear discharge, ear pain, mouth sores, rhinorrhea, sinus pressure, sinus pain, sore throat, tinnitus and trouble swallowing.    Eyes:  Negative for photophobia, pain and visual disturbance.   Respiratory:  Negative for cough, chest tightness, shortness of breath and stridor.    Cardiovascular:  Negative for chest pain and palpitations.   Gastrointestinal:  Negative for abdominal distention, abdominal pain, blood in stool, constipation, diarrhea and rectal pain.   Endocrine: Negative for cold intolerance, heat intolerance, polydipsia, polyphagia and polyuria.   Genitourinary:  Negative for dysuria, flank pain, hematuria and urgency.   Musculoskeletal:  Negative for arthralgias, gait problem, myalgias and neck stiffness.   Skin:  Negative for color change and rash.   Allergic/Immunologic: Negative for environmental allergies and food allergies.   Neurological:  Negative for dizziness, seizures, syncope, speech difficulty and headaches.   Hematological:  Negative for adenopathy.   Psychiatric/Behavioral:  Negative for agitation, confusion, decreased concentration, dysphoric mood and sleep disturbance. The patient is not nervous/anxious.        Objective   BP (!) 190/88   Pulse 72   Temp 36.2 °C (97.2 °F)   Resp 15   Ht  "1.727 m (5' 8\")   Wt 98.4 kg (217 lb)   SpO2 96%   BMI 32.99 kg/m²     Physical Exam  Vitals reviewed.   Constitutional:       General: She is not in acute distress.     Appearance: She is obese. She is not ill-appearing or diaphoretic.   HENT:      Head: Normocephalic.      Right Ear: Tympanic membrane and external ear normal.      Left Ear: Tympanic membrane and external ear normal.      Nose: Nose normal. No congestion.      Mouth/Throat:      Pharynx: No posterior oropharyngeal erythema.   Eyes:      General:         Right eye: No discharge.         Left eye: No discharge.      Extraocular Movements: Extraocular movements intact.      Conjunctiva/sclera: Conjunctivae normal.      Pupils: Pupils are equal, round, and reactive to light.   Cardiovascular:      Rate and Rhythm: Normal rate and regular rhythm.      Pulses: Normal pulses.      Heart sounds: Normal heart sounds. No murmur heard.  Pulmonary:      Effort: Pulmonary effort is normal. No respiratory distress.      Breath sounds: Normal breath sounds. No wheezing or rales.   Chest:      Chest wall: No tenderness.   Abdominal:      General: Bowel sounds are normal. There is distension.      Palpations: There is no mass.      Tenderness: There is no abdominal tenderness. There is no guarding.   Musculoskeletal:         General: No tenderness. Normal range of motion.      Cervical back: Normal range of motion and neck supple. No tenderness.      Right lower leg: No edema.      Left lower leg: No edema.   Skin:     General: Skin is dry.      Coloration: Skin is not jaundiced.      Findings: No bruising, erythema or rash.   Neurological:      General: No focal deficit present.      Mental Status: She is alert and oriented to person, place, and time. Mental status is at baseline.      Cranial Nerves: No cranial nerve deficit.      Sensory: No sensory deficit.      Coordination: Coordination normal.      Gait: Gait normal.   Psychiatric:         Mood and " Affect: Mood normal.         Thought Content: Thought content normal.         Judgment: Judgment normal.         Assessment/Plan   Problem List Items Addressed This Visit             ICD-10-CM    Essential hypertension - Primary I10    Relevant Medications    dilTIAZem ER (Tiazac) 360 mg 24 hr capsule    losartan (Cozaar) 100 mg tablet    hydroCHLOROthiazide (HYDRODiuril) 25 mg tablet    hydrALAZINE (Apresoline) 100 mg tablet    Hyperlipidemia E78.5    Obstructive sleep apnea, adult G47.33         Scribe Attestation  By signing my name below, I, EMMY Drake , Scribe   attest that this documentation has been prepared under the direction and in the presence of Rupert Balderrama DO.   Provider Attestation - Scribe documentation    All medical record entries made by the Scribe were at my direction and personally dictated by me. I have reviewed the chart and agree that the record accurately reflects my personal performance of the history, physical exam, discussion and plan.

## 2023-12-23 DIAGNOSIS — M25.562 LEFT KNEE PAIN, UNSPECIFIED CHRONICITY: ICD-10-CM

## 2023-12-26 RX ORDER — MELOXICAM 7.5 MG/1
TABLET ORAL
Qty: 60 TABLET | Refills: 1 | Status: SHIPPED | OUTPATIENT
Start: 2023-12-26

## 2024-01-05 ENCOUNTER — OFFICE VISIT (OUTPATIENT)
Dept: PRIMARY CARE | Facility: CLINIC | Age: 79
End: 2024-01-05
Payer: MEDICARE

## 2024-01-05 VITALS
BODY MASS INDEX: 32.58 KG/M2 | HEIGHT: 68 IN | TEMPERATURE: 96.7 F | OXYGEN SATURATION: 98 % | DIASTOLIC BLOOD PRESSURE: 72 MMHG | SYSTOLIC BLOOD PRESSURE: 156 MMHG | WEIGHT: 215 LBS | RESPIRATION RATE: 15 BRPM | HEART RATE: 64 BPM

## 2024-01-05 DIAGNOSIS — G47.33 SEVERE OBSTRUCTIVE SLEEP APNEA: ICD-10-CM

## 2024-01-05 DIAGNOSIS — I10 ESSENTIAL HYPERTENSION: Primary | ICD-10-CM

## 2024-01-05 DIAGNOSIS — I48.0 PAROXYSMAL ATRIAL FIBRILLATION (MULTI): ICD-10-CM

## 2024-01-05 PROBLEM — R93.89 INCREASED ENDOMETRIAL STRIPE THICKNESS: Status: ACTIVE | Noted: 2024-01-05

## 2024-01-05 PROBLEM — I65.29 CAROTID ARTERY STENOSIS: Status: ACTIVE | Noted: 2024-01-05

## 2024-01-05 PROBLEM — Z86.79 HISTORY OF SICK SINUS SYNDROME: Status: RESOLVED | Noted: 2024-01-05 | Resolved: 2024-01-05

## 2024-01-05 PROBLEM — Z98.890 HISTORY OF REPAIR OF RETINAL TEAR BY LASER PHOTOCOAGULATION: Status: RESOLVED | Noted: 2023-09-19 | Resolved: 2024-01-05

## 2024-01-05 PROCEDURE — 99213 OFFICE O/P EST LOW 20 MIN: CPT | Performed by: FAMILY MEDICINE

## 2024-01-05 ASSESSMENT — ENCOUNTER SYMPTOMS
HEMATURIA: 0
HYPERTENSION: 1
ADENOPATHY: 0
SINUS PRESSURE: 0
DIZZINESS: 0
DIARRHEA: 0
ABDOMINAL DISTENTION: 0
FLANK PAIN: 0
CONSTIPATION: 0
SPEECH DIFFICULTY: 0
ACTIVITY CHANGE: 0
POLYPHAGIA: 0
CHEST TIGHTNESS: 0
TROUBLE SWALLOWING: 0
SHORTNESS OF BREATH: 0
STRIDOR: 0
PALPITATIONS: 0
APPETITE CHANGE: 0
PHOTOPHOBIA: 0
RECTAL PAIN: 0
BLOOD IN STOOL: 0
SEIZURES: 0
RHINORRHEA: 0
NECK STIFFNESS: 0
SINUS PAIN: 0
EYE PAIN: 0
CONSTITUTIONAL NEGATIVE: 1
POLYDIPSIA: 0
COLOR CHANGE: 0
DYSURIA: 0

## 2024-01-05 NOTE — PATIENT INSTRUCTIONS
Follow up in 3 months    Continue current medications and therapy for chronic medical conditions.    Patient was advised importance of proper diet/nutrition in addition adequate hydration. Patient was encouraged moderate exercise program to include 30 minutes daily for 5 days of the week or 150 minutes weekly. Patient will follow-up with us as scheduled.    Review lab results from September 2023    CPAP ORDERED- SLEEP STUDY COMPLETED IN 2022 INDICATING SEVERE SLEEP APNEA

## 2024-01-05 NOTE — PROGRESS NOTES
"Subjective   Patient ID: Daisy Borja is a 78 y.o. female who presents for Hypertension.    Patient is here for follow up on hypertension.    Patient states feeling well.     Patient denies any symptoms or concerns today.    Patient has experiencing daytime somnolence, has stopped breathing, and snoring and is need of a CPAP. Patient had positive sleep study in 2022 and has severe sleep apnea       Hypertension  This is a recurrent problem. The current episode started more than 1 year ago. The problem is unchanged. Pertinent negatives include no palpitations or shortness of breath.        Review of Systems   Constitutional: Negative.  Negative for activity change and appetite change.   HENT:  Negative for dental problem, ear discharge, ear pain, mouth sores, rhinorrhea, sinus pressure, sinus pain, tinnitus and trouble swallowing.    Eyes:  Negative for photophobia, pain and visual disturbance.   Respiratory:  Negative for chest tightness, shortness of breath and stridor.    Cardiovascular:  Negative for palpitations.   Gastrointestinal:  Negative for abdominal distention, blood in stool, constipation, diarrhea and rectal pain.   Endocrine: Negative for cold intolerance, heat intolerance, polydipsia, polyphagia and polyuria.   Genitourinary:  Negative for dysuria, flank pain, hematuria and urgency.   Musculoskeletal:  Negative for gait problem and neck stiffness.   Skin:  Negative for color change.   Allergic/Immunologic: Negative for environmental allergies and food allergies.   Neurological:  Negative for dizziness, seizures, syncope and speech difficulty.   Hematological:  Negative for adenopathy.       Objective   /72   Pulse 64   Temp 35.9 °C (96.7 °F)   Resp 15   Ht 1.727 m (5' 8\")   Wt 97.5 kg (215 lb)   SpO2 98%   BMI 32.69 kg/m²     Physical Exam  Vitals reviewed.   Constitutional:       General: She is not in acute distress.     Appearance: Normal appearance. She is normal weight. She is not " ill-appearing or diaphoretic.   HENT:      Head: Normocephalic.      Right Ear: Tympanic membrane and external ear normal.      Left Ear: Tympanic membrane and external ear normal.      Nose: Nose normal. No congestion.      Mouth/Throat:      Pharynx: No posterior oropharyngeal erythema.   Eyes:      General:         Right eye: No discharge.         Left eye: No discharge.      Extraocular Movements: Extraocular movements intact.      Conjunctiva/sclera: Conjunctivae normal.      Pupils: Pupils are equal, round, and reactive to light.   Cardiovascular:      Rate and Rhythm: Normal rate and regular rhythm.      Pulses: Normal pulses.      Heart sounds: Normal heart sounds. No murmur heard.  Pulmonary:      Effort: Pulmonary effort is normal. No respiratory distress.      Breath sounds: Normal breath sounds. No wheezing or rales.   Chest:      Chest wall: No tenderness.   Abdominal:      General: Abdomen is flat. Bowel sounds are normal. There is no distension.      Palpations: There is no mass.      Tenderness: There is no abdominal tenderness. There is no guarding.   Musculoskeletal:         General: No tenderness. Normal range of motion.      Cervical back: Normal range of motion and neck supple. No tenderness.      Right lower leg: No edema.      Left lower leg: No edema.   Skin:     General: Skin is dry.      Coloration: Skin is not jaundiced.      Findings: No bruising, erythema or rash.   Neurological:      General: No focal deficit present.      Mental Status: She is alert and oriented to person, place, and time. Mental status is at baseline.      Cranial Nerves: No cranial nerve deficit.      Sensory: No sensory deficit.      Coordination: Coordination normal.      Gait: Gait normal.   Psychiatric:         Mood and Affect: Mood normal.         Thought Content: Thought content normal.         Judgment: Judgment normal.         Assessment/Plan   Problem List Items Addressed This Visit             ICD-10-CM     Essential hypertension - Primary I10    Paroxysmal atrial fibrillation (CMS/[facility]) I48.0     Other Visit Diagnoses         Codes    Severe obstructive sleep apnea     G47.33    Relevant Orders    Positive Airway Pressure (PAP) Therapy              Scribe Attestation  By signing my name below, I, EMMY Draek , Farhadibe   attest that this documentation has been prepared under the direction and in the presence of Rupert Balderrama DO.   Provider Attestation - Scribe documentation    All medical record entries made by the Scribe were at my direction and personally dictated by me. I have reviewed the chart and agree that the record accurately reflects my personal performance of the history, physical exam, discussion and plan.

## 2024-01-08 DIAGNOSIS — I10 ESSENTIAL HYPERTENSION: ICD-10-CM

## 2024-01-08 RX ORDER — SOTALOL HYDROCHLORIDE 80 MG/1
TABLET ORAL
Qty: 180 TABLET | Refills: 1 | Status: SHIPPED | OUTPATIENT
Start: 2024-01-08

## 2024-03-06 DIAGNOSIS — I10 ESSENTIAL HYPERTENSION: ICD-10-CM

## 2024-03-07 RX ORDER — HYDRALAZINE HYDROCHLORIDE 100 MG/1
100 TABLET, FILM COATED ORAL 3 TIMES DAILY
Qty: 90 TABLET | Refills: 1 | Status: SHIPPED | OUTPATIENT
Start: 2024-03-07

## 2024-03-13 ENCOUNTER — HOSPITAL ENCOUNTER (OUTPATIENT)
Dept: CARDIOLOGY | Facility: HOSPITAL | Age: 79
Discharge: HOME | End: 2024-03-13
Payer: MEDICARE

## 2024-03-13 DIAGNOSIS — I49.5 SICK SINUS SYNDROME (MULTI): ICD-10-CM

## 2024-03-13 DIAGNOSIS — Z95.0 PACEMAKER: ICD-10-CM

## 2024-03-13 PROCEDURE — 93294 REM INTERROG EVL PM/LDLS PM: CPT | Performed by: INTERNAL MEDICINE

## 2024-03-13 PROCEDURE — 93296 REM INTERROG EVL PM/IDS: CPT

## 2024-04-12 DIAGNOSIS — I10 ESSENTIAL HYPERTENSION: ICD-10-CM

## 2024-04-12 RX ORDER — HYDROCHLOROTHIAZIDE 25 MG/1
25 TABLET ORAL DAILY
Qty: 30 TABLET | Refills: 2 | Status: SHIPPED | OUTPATIENT
Start: 2024-04-12

## 2024-04-18 ENCOUNTER — APPOINTMENT (OUTPATIENT)
Dept: PRIMARY CARE | Facility: CLINIC | Age: 79
End: 2024-04-18
Payer: MEDICARE

## 2024-05-03 ENCOUNTER — TELEPHONE (OUTPATIENT)
Dept: PRIMARY CARE | Facility: CLINIC | Age: 79
End: 2024-05-03

## 2024-05-03 ENCOUNTER — OFFICE VISIT (OUTPATIENT)
Dept: PRIMARY CARE | Facility: CLINIC | Age: 79
End: 2024-05-03
Payer: MEDICARE

## 2024-05-03 VITALS
RESPIRATION RATE: 18 BRPM | SYSTOLIC BLOOD PRESSURE: 140 MMHG | WEIGHT: 213 LBS | HEIGHT: 68 IN | BODY MASS INDEX: 32.28 KG/M2 | DIASTOLIC BLOOD PRESSURE: 66 MMHG | TEMPERATURE: 97.9 F | HEART RATE: 60 BPM | OXYGEN SATURATION: 98 %

## 2024-05-03 DIAGNOSIS — I10 ESSENTIAL HYPERTENSION: ICD-10-CM

## 2024-05-03 DIAGNOSIS — I35.0 AORTIC VALVE STENOSIS, ETIOLOGY OF CARDIAC VALVE DISEASE UNSPECIFIED: ICD-10-CM

## 2024-05-03 DIAGNOSIS — R35.1 NOCTURIA: ICD-10-CM

## 2024-05-03 DIAGNOSIS — Z12.31 ENCOUNTER FOR SCREENING MAMMOGRAM FOR MALIGNANT NEOPLASM OF BREAST: ICD-10-CM

## 2024-05-03 PROBLEM — M85.80 OSTEOPENIA: Status: ACTIVE | Noted: 2024-05-03

## 2024-05-03 PROBLEM — E03.9 HYPOTHYROIDISM: Status: ACTIVE | Noted: 2024-05-03

## 2024-05-03 PROBLEM — R40.0 DAYTIME SOMNOLENCE: Status: ACTIVE | Noted: 2024-05-03

## 2024-05-03 PROCEDURE — 99214 OFFICE O/P EST MOD 30 MIN: CPT | Performed by: FAMILY MEDICINE

## 2024-05-03 ASSESSMENT — ENCOUNTER SYMPTOMS
SHORTNESS OF BREATH: 0
SORE THROAT: 0
RECTAL PAIN: 0
AGITATION: 0
CONSTIPATION: 0
COLOR CHANGE: 0
DIARRHEA: 0
DIZZINESS: 0
HEADACHES: 0
SEIZURES: 0
DYSPHORIC MOOD: 0
ABDOMINAL PAIN: 0
CHEST TIGHTNESS: 0
CONFUSION: 0
SINUS PAIN: 0
RHINORRHEA: 0
HEMATURIA: 0
SINUS PRESSURE: 0
SPEECH DIFFICULTY: 0
CONSTITUTIONAL NEGATIVE: 1
ARTHRALGIAS: 0
APPETITE CHANGE: 0
DYSURIA: 0
PALPITATIONS: 0
POLYPHAGIA: 0
FATIGUE: 0
FLANK PAIN: 0
EYE PAIN: 0
TROUBLE SWALLOWING: 0
BLOOD IN STOOL: 0
NECK STIFFNESS: 0
ADENOPATHY: 0
DECREASED CONCENTRATION: 0
COUGH: 0
PHOTOPHOBIA: 0
FEVER: 0
ABDOMINAL DISTENTION: 0
POLYDIPSIA: 0
STRIDOR: 0
MYALGIAS: 0
HYPERTENSION: 1
SLEEP DISTURBANCE: 0
ACTIVITY CHANGE: 0

## 2024-05-03 NOTE — PATIENT INSTRUCTIONS
Follow up in 4 weeks    Continue current medications and therapy for chronic medical conditions.    Patient was advised importance of proper diet/nutrition in addition adequate hydration. Patient was encouraged moderate exercise program to include 30 minutes daily for 5 days of the week or 150 minutes weekly. Patient will follow-up with us as scheduled.    Adjust hydralazine to 100 mg at bedtime and 50 mg in the morning    Fasting blood work ordered to also include spot urine sodium and potassium    Obtain a transthoracic echocardiogram    Schedule screening mammogram/requested by patient

## 2024-05-03 NOTE — TELEPHONE ENCOUNTER
Apap was ordered 1/5/2024 but pt did not receive a unit. Called [facility] today and learned that pt received a machine 10/17/2022 and there fore was not eligible for new machine.     Pt stated at 5/3/24 OV that machine was taken back.      Reached out to [facility] again and was informed that pt was non-compliant and thus insurance denied claim and machine had to be returned.     Attempted to reached pt via phone to educate her on the necessity of compliance for billing purposes before placing an order for a new machine.     LVM.     Will reach out again on 5/6/24

## 2024-05-03 NOTE — PROGRESS NOTES
"Subjective   Patient ID: Daisy Borja is a 78 y.o. female who presents for Hypertension.    Patient would like discuss B/P medication dose.    Patient denies any symptoms or concerns today.    Hypertension  This is a recurrent problem. The current episode started more than 1 year ago. The problem is unchanged. Pertinent negatives include no chest pain, headaches, palpitations or shortness of breath. There are no associated agents to hypertension. There are no known risk factors for coronary artery disease.        Review of Systems   Constitutional: Negative.  Negative for activity change, appetite change, fatigue and fever.   HENT:  Negative for congestion, dental problem, ear discharge, ear pain, mouth sores, rhinorrhea, sinus pressure, sinus pain, sore throat, tinnitus and trouble swallowing.    Eyes:  Negative for photophobia, pain and visual disturbance.   Respiratory:  Negative for cough, chest tightness, shortness of breath and stridor.    Cardiovascular:  Negative for chest pain and palpitations.   Gastrointestinal:  Negative for abdominal distention, abdominal pain, blood in stool, constipation, diarrhea and rectal pain.   Endocrine: Negative for cold intolerance, heat intolerance, polydipsia, polyphagia and polyuria.   Genitourinary:  Negative for dysuria, flank pain, hematuria and urgency.   Musculoskeletal:  Negative for arthralgias, gait problem, myalgias and neck stiffness.   Skin:  Negative for color change and rash.   Allergic/Immunologic: Negative for environmental allergies and food allergies.   Neurological:  Negative for dizziness, seizures, syncope, speech difficulty and headaches.   Hematological:  Negative for adenopathy.   Psychiatric/Behavioral:  Negative for agitation, confusion, decreased concentration, dysphoric mood and sleep disturbance. The patient is nervous/anxious.        Objective   /66   Pulse 60   Temp 36.6 °C (97.9 °F)   Resp 18   Ht 1.727 m (5' 8\")   Wt 96.6 kg (213 lb) "   SpO2 98%   BMI 32.39 kg/m²     Physical Exam  Vitals reviewed.   Constitutional:       General: She is not in acute distress.     Appearance: Normal appearance. She is normal weight. She is not ill-appearing or diaphoretic.   HENT:      Head: Normocephalic.      Right Ear: Tympanic membrane and external ear normal.      Left Ear: Tympanic membrane and external ear normal.      Nose: Nose normal. No congestion.      Mouth/Throat:      Pharynx: No posterior oropharyngeal erythema.   Eyes:      General:         Right eye: No discharge.         Left eye: No discharge.      Extraocular Movements: Extraocular movements intact.      Conjunctiva/sclera: Conjunctivae normal.      Pupils: Pupils are equal, round, and reactive to light.   Cardiovascular:      Rate and Rhythm: Normal rate and regular rhythm.      Pulses: Normal pulses.      Heart sounds: Normal heart sounds. No murmur heard.  Pulmonary:      Effort: Pulmonary effort is normal. No respiratory distress.      Breath sounds: Normal breath sounds. No wheezing or rales.   Chest:      Chest wall: No tenderness.   Abdominal:      General: Abdomen is flat. Bowel sounds are normal. There is no distension.      Palpations: There is no mass.      Tenderness: There is no abdominal tenderness. There is no guarding.   Musculoskeletal:         General: No tenderness. Normal range of motion.      Cervical back: Normal range of motion and neck supple. No tenderness.      Right lower leg: No edema.      Left lower leg: No edema.   Skin:     General: Skin is dry.      Coloration: Skin is not jaundiced.      Findings: No bruising, erythema or rash.   Neurological:      General: No focal deficit present.      Mental Status: She is alert and oriented to person, place, and time. Mental status is at baseline.      Cranial Nerves: No cranial nerve deficit.      Sensory: No sensory deficit.      Coordination: Coordination normal.      Gait: Gait normal.   Psychiatric:         Thought  Content: Thought content normal.         Judgment: Judgment normal.      Comments: Anxious         Assessment/Plan   Problem List Items Addressed This Visit             ICD-10-CM    Essential hypertension I10    Relevant Medications    losartan (Cozaar) 100 mg tablet    Other Relevant Orders    Transthoracic Echo (TTE) Complete    Potassium, urine, random    Sodium, urine, random     Other Visit Diagnoses         Codes    Aortic valve stenosis, etiology of cardiac valve disease unspecified     I35.0    Relevant Orders    Transthoracic Echo (TTE) Complete    Nocturia     R35.1    Relevant Orders    Potassium, urine, random    Sodium, urine, random    Encounter for screening mammogram for malignant neoplasm of breast     Z12.31    Relevant Orders    BI mammo bilateral screening tomosynthesis             Scribe Attestation  By signing my name below, I, Rupert Balderrama DO , Scribe   attest that this documentation has been prepared under the direction and in the presence of Rupert Balderrama DO.    Provider Attestation - Scribe documentation    All medical record entries made by the Scribe were at my direction and personally dictated by me. I have reviewed the chart and agree that the record accurately reflects my personal performance of the history, physical exam, discussion and plan.

## 2024-05-04 RX ORDER — LOSARTAN POTASSIUM 100 MG/1
100 TABLET ORAL DAILY
Qty: 90 TABLET | Refills: 0 | Status: SHIPPED | OUTPATIENT
Start: 2024-05-04

## 2024-05-05 ASSESSMENT — ENCOUNTER SYMPTOMS: NERVOUS/ANXIOUS: 1

## 2024-05-28 ENCOUNTER — HOSPITAL ENCOUNTER (OUTPATIENT)
Dept: RADIOLOGY | Facility: CLINIC | Age: 79
Discharge: HOME | End: 2024-05-28
Payer: MEDICARE

## 2024-05-28 VITALS — WEIGHT: 213 LBS | BODY MASS INDEX: 32.28 KG/M2 | HEIGHT: 68 IN

## 2024-05-28 DIAGNOSIS — Z12.31 ENCOUNTER FOR SCREENING MAMMOGRAM FOR MALIGNANT NEOPLASM OF BREAST: ICD-10-CM

## 2024-05-28 PROCEDURE — 77063 BREAST TOMOSYNTHESIS BI: CPT | Performed by: RADIOLOGY

## 2024-05-28 PROCEDURE — 77067 SCR MAMMO BI INCL CAD: CPT | Performed by: RADIOLOGY

## 2024-05-28 PROCEDURE — 77067 SCR MAMMO BI INCL CAD: CPT

## 2024-06-20 ENCOUNTER — HOSPITAL ENCOUNTER (OUTPATIENT)
Dept: CARDIOLOGY | Facility: HOSPITAL | Age: 79
Discharge: HOME | End: 2024-06-20
Payer: MEDICARE

## 2024-06-20 DIAGNOSIS — I49.5 SICK SINUS SYNDROME (MULTI): ICD-10-CM

## 2024-06-20 DIAGNOSIS — Z95.0 PACEMAKER: ICD-10-CM

## 2024-06-20 PROCEDURE — 93296 REM INTERROG EVL PM/IDS: CPT

## 2024-06-26 DIAGNOSIS — I10 ESSENTIAL HYPERTENSION: ICD-10-CM

## 2024-06-28 RX ORDER — DILTIAZEM HYDROCHLORIDE 360 MG/1
360 CAPSULE, EXTENDED RELEASE ORAL
Qty: 90 CAPSULE | Refills: 1 | Status: SHIPPED | OUTPATIENT
Start: 2024-06-28

## 2024-07-08 ENCOUNTER — HOSPITAL ENCOUNTER (OUTPATIENT)
Dept: CARDIOLOGY | Facility: HOSPITAL | Age: 79
Discharge: HOME | End: 2024-07-08
Payer: MEDICARE

## 2024-07-08 DIAGNOSIS — I10 ESSENTIAL HYPERTENSION: ICD-10-CM

## 2024-07-08 DIAGNOSIS — I35.0 AORTIC VALVE STENOSIS, ETIOLOGY OF CARDIAC VALVE DISEASE UNSPECIFIED: ICD-10-CM

## 2024-07-08 LAB
AORTIC VALVE MEAN GRADIENT: 8.5 MMHG
AORTIC VALVE PEAK VELOCITY: 2.07 M/S
AV PEAK GRADIENT: 17.1 MMHG
AVA (PEAK VEL): 1.84 CM2
AVA (VTI): 2.01 CM2
EJECTION FRACTION APICAL 4 CHAMBER: 57.7
EJECTION FRACTION: 58 %
LEFT ATRIUM VOLUME AREA LENGTH INDEX BSA: 36.3 ML/M2
LEFT VENTRICLE INTERNAL DIMENSION DIASTOLE: 5.32 CM (ref 3.5–6)
LEFT VENTRICULAR OUTFLOW TRACT DIAMETER: 1.97 CM
LV EJECTION FRACTION BIPLANE: 56 %
MITRAL VALVE E/A RATIO: 1.1
RIGHT VENTRICLE FREE WALL PEAK S': 11 CM/S
RIGHT VENTRICLE PEAK SYSTOLIC PRESSURE: 41.1 MMHG
TRICUSPID ANNULAR PLANE SYSTOLIC EXCURSION: 2 CM

## 2024-07-08 PROCEDURE — 93306 TTE W/DOPPLER COMPLETE: CPT | Performed by: INTERNAL MEDICINE

## 2024-07-08 PROCEDURE — 93306 TTE W/DOPPLER COMPLETE: CPT

## 2024-07-23 ENCOUNTER — LAB (OUTPATIENT)
Dept: LAB | Facility: LAB | Age: 79
End: 2024-07-23
Payer: MEDICARE

## 2024-07-23 DIAGNOSIS — E78.2 MIXED HYPERLIPIDEMIA: ICD-10-CM

## 2024-07-23 DIAGNOSIS — R35.1 NOCTURIA: ICD-10-CM

## 2024-07-23 DIAGNOSIS — I10 ESSENTIAL HYPERTENSION: ICD-10-CM

## 2024-07-23 LAB
ALBUMIN SERPL BCP-MCNC: 4.3 G/DL (ref 3.4–5)
ALP SERPL-CCNC: 69 U/L (ref 33–136)
ALT SERPL W P-5'-P-CCNC: 21 U/L (ref 7–45)
ANION GAP SERPL CALC-SCNC: 11 MMOL/L (ref 10–20)
AST SERPL W P-5'-P-CCNC: 17 U/L (ref 9–39)
BILIRUB SERPL-MCNC: 0.5 MG/DL (ref 0–1.2)
BUN SERPL-MCNC: 10 MG/DL (ref 6–23)
CALCIUM SERPL-MCNC: 9.4 MG/DL (ref 8.6–10.3)
CHLORIDE SERPL-SCNC: 105 MMOL/L (ref 98–107)
CHOLEST SERPL-MCNC: 160 MG/DL (ref 0–199)
CHOLESTEROL/HDL RATIO: 3.3
CO2 SERPL-SCNC: 29 MMOL/L (ref 21–32)
CREAT SERPL-MCNC: 0.84 MG/DL (ref 0.5–1.05)
CREAT UR-MCNC: 49.1 MG/DL (ref 20–320)
CREAT UR-MCNC: 49.1 MG/DL (ref 20–320)
EGFRCR SERPLBLD CKD-EPI 2021: 71 ML/MIN/1.73M*2
GLUCOSE SERPL-MCNC: 116 MG/DL (ref 74–99)
HDLC SERPL-MCNC: 49.1 MG/DL
LDLC SERPL CALC-MCNC: 93 MG/DL
NON HDL CHOLESTEROL: 111 MG/DL (ref 0–149)
POTASSIUM SERPL-SCNC: 4.2 MMOL/L (ref 3.5–5.3)
POTASSIUM UR-SCNC: 30 MMOL/L
POTASSIUM/CREAT UR-RTO: 61 MMOL/G CREAT
PROT SERPL-MCNC: 6.4 G/DL (ref 6.4–8.2)
SODIUM SERPL-SCNC: 141 MMOL/L (ref 136–145)
SODIUM UR-SCNC: 77 MMOL/L
SODIUM/CREAT UR-RTO: 157 MMOL/G CREAT
TRIGL SERPL-MCNC: 92 MG/DL (ref 0–149)
VLDL: 18 MG/DL (ref 0–40)

## 2024-07-23 PROCEDURE — 80053 COMPREHEN METABOLIC PANEL: CPT

## 2024-07-23 PROCEDURE — 82570 ASSAY OF URINE CREATININE: CPT

## 2024-07-23 PROCEDURE — 84300 ASSAY OF URINE SODIUM: CPT

## 2024-07-23 PROCEDURE — 36415 COLL VENOUS BLD VENIPUNCTURE: CPT

## 2024-07-23 PROCEDURE — 80061 LIPID PANEL: CPT

## 2024-07-23 PROCEDURE — 84133 ASSAY OF URINE POTASSIUM: CPT

## 2024-08-02 ENCOUNTER — APPOINTMENT (OUTPATIENT)
Dept: PRIMARY CARE | Facility: CLINIC | Age: 79
End: 2024-08-02
Payer: MEDICARE

## 2024-08-02 VITALS
TEMPERATURE: 97.1 F | HEIGHT: 68 IN | RESPIRATION RATE: 18 BRPM | OXYGEN SATURATION: 98 % | SYSTOLIC BLOOD PRESSURE: 160 MMHG | DIASTOLIC BLOOD PRESSURE: 82 MMHG | WEIGHT: 211 LBS | BODY MASS INDEX: 31.98 KG/M2 | HEART RATE: 63 BPM

## 2024-08-02 DIAGNOSIS — E03.9 ACQUIRED HYPOTHYROIDISM: ICD-10-CM

## 2024-08-02 DIAGNOSIS — Z00.00 ENCOUNTER FOR MEDICARE ANNUAL WELLNESS EXAM: Primary | ICD-10-CM

## 2024-08-02 DIAGNOSIS — Z00.00 ROUTINE GENERAL MEDICAL EXAMINATION AT HEALTH CARE FACILITY: ICD-10-CM

## 2024-08-02 DIAGNOSIS — I10 ESSENTIAL HYPERTENSION: ICD-10-CM

## 2024-08-02 DIAGNOSIS — G47.33 OBSTRUCTIVE SLEEP APNEA, ADULT: ICD-10-CM

## 2024-08-02 RX ORDER — SOTALOL HYDROCHLORIDE 80 MG/1
80 TABLET ORAL 2 TIMES DAILY
Qty: 180 TABLET | Refills: 1 | Status: SHIPPED | OUTPATIENT
Start: 2024-08-02

## 2024-08-02 RX ORDER — LOSARTAN POTASSIUM AND HYDROCHLOROTHIAZIDE 25; 100 MG/1; MG/1
1 TABLET ORAL DAILY
Qty: 90 TABLET | Refills: 1 | Status: SHIPPED | OUTPATIENT
Start: 2024-08-02

## 2024-08-02 ASSESSMENT — ENCOUNTER SYMPTOMS
DYSURIA: 0
FLANK PAIN: 0
ABDOMINAL DISTENTION: 0
HEMATURIA: 0
SORE THROAT: 0
SINUS PAIN: 0
SEIZURES: 0
COLOR CHANGE: 0
EYE PAIN: 0
OCCASIONAL FEELINGS OF UNSTEADINESS: 0
CONSTIPATION: 0
DEPRESSION: 0
CHEST TIGHTNESS: 0
DIZZINESS: 0
DECREASED CONCENTRATION: 0
RECTAL PAIN: 0
CONSTITUTIONAL NEGATIVE: 1
FEVER: 0
ADENOPATHY: 0
DYSPHORIC MOOD: 0
POLYDIPSIA: 0
MYALGIAS: 0
RHINORRHEA: 0
NECK STIFFNESS: 0
STRIDOR: 0
AGITATION: 0
LOSS OF SENSATION IN FEET: 0
NERVOUS/ANXIOUS: 0
ABDOMINAL PAIN: 0
PALPITATIONS: 0
PHOTOPHOBIA: 0
HEADACHES: 0
FATIGUE: 0
TROUBLE SWALLOWING: 0
DIARRHEA: 0
APPETITE CHANGE: 0
CONFUSION: 0
POLYPHAGIA: 0
COUGH: 0
SLEEP DISTURBANCE: 0
BLOOD IN STOOL: 0
ARTHRALGIAS: 0
SINUS PRESSURE: 0
ACTIVITY CHANGE: 0
SHORTNESS OF BREATH: 0
SPEECH DIFFICULTY: 0

## 2024-08-02 ASSESSMENT — PATIENT HEALTH QUESTIONNAIRE - PHQ9
2. FEELING DOWN, DEPRESSED OR HOPELESS: NOT AT ALL
1. LITTLE INTEREST OR PLEASURE IN DOING THINGS: NOT AT ALL
SUM OF ALL RESPONSES TO PHQ9 QUESTIONS 1 AND 2: 0

## 2024-08-02 ASSESSMENT — ACTIVITIES OF DAILY LIVING (ADL)
DRESSING: INDEPENDENT
TAKING_MEDICATION: INDEPENDENT
DOING_HOUSEWORK: INDEPENDENT
BATHING: INDEPENDENT
GROCERY_SHOPPING: INDEPENDENT
MANAGING_FINANCES: INDEPENDENT

## 2024-08-02 NOTE — PROGRESS NOTES
"Subjective   Reason for Visit: Daisy Borja is an 78 y.o. female here for a Medicare Wellness visit.     Past Medical, Surgical, and Family History reviewed and updated in chart.     Patient would like discuss CPAP used.    Patient denies any symptoms or concerns today.      Patient Care Team:  Rupert Balderrama DO as PCP - General (Family Medicine)  Rupert Balderrama DO as PCP - O Medicare Advantage PCP     Review of Systems   Constitutional: Negative.  Negative for activity change, appetite change, fatigue and fever.   HENT:  Negative for congestion, dental problem, ear discharge, ear pain, mouth sores, rhinorrhea, sinus pressure, sinus pain, sore throat, tinnitus and trouble swallowing.    Eyes:  Negative for photophobia, pain and visual disturbance.   Respiratory:  Negative for cough, chest tightness, shortness of breath and stridor.    Cardiovascular:  Negative for chest pain and palpitations.   Gastrointestinal:  Negative for abdominal distention, abdominal pain, blood in stool, constipation, diarrhea and rectal pain.   Endocrine: Negative for cold intolerance, heat intolerance, polydipsia, polyphagia and polyuria.   Genitourinary:  Negative for dysuria, flank pain, hematuria and urgency.   Musculoskeletal:  Negative for arthralgias, gait problem, myalgias and neck stiffness.   Skin:  Negative for color change and rash.   Allergic/Immunologic: Negative for environmental allergies and food allergies.   Neurological:  Negative for dizziness, seizures, syncope, speech difficulty and headaches.   Hematological:  Negative for adenopathy.   Psychiatric/Behavioral:  Negative for agitation, confusion, decreased concentration, dysphoric mood and sleep disturbance. The patient is not nervous/anxious.        Objective   Vitals:  /82 (BP Location: Right arm, Patient Position: Sitting, BP Cuff Size: Adult)   Pulse 63   Temp 36.2 °C (97.1 °F)   Resp 18   Ht 1.727 m (5' 8\")   Wt 95.7 kg (211 lb)   SpO2 98%   " BMI 32.08 kg/m²       Physical Exam  Vitals reviewed.   Constitutional:       General: She is not in acute distress.     Appearance: She is obese. She is not ill-appearing or diaphoretic.   HENT:      Head: Normocephalic.      Right Ear: Tympanic membrane and external ear normal.      Left Ear: Tympanic membrane and external ear normal.      Nose: Nose normal. No congestion.      Mouth/Throat:      Pharynx: No posterior oropharyngeal erythema.   Eyes:      General:         Right eye: No discharge.         Left eye: No discharge.      Extraocular Movements: Extraocular movements intact.      Conjunctiva/sclera: Conjunctivae normal.      Pupils: Pupils are equal, round, and reactive to light.   Cardiovascular:      Rate and Rhythm: Normal rate and regular rhythm.      Pulses: Normal pulses.      Heart sounds: Normal heart sounds. No murmur heard.  Pulmonary:      Effort: Pulmonary effort is normal. No respiratory distress.      Breath sounds: Normal breath sounds. No wheezing or rales.   Chest:      Chest wall: No tenderness.   Abdominal:      General: Bowel sounds are normal. There is distension.      Palpations: There is no mass.      Tenderness: There is no abdominal tenderness. There is no guarding.   Musculoskeletal:         General: No tenderness. Normal range of motion.      Cervical back: Normal range of motion and neck supple. No tenderness.      Right lower leg: No edema.      Left lower leg: No edema.   Skin:     General: Skin is dry.      Coloration: Skin is not jaundiced.      Findings: No bruising, erythema or rash.   Neurological:      General: No focal deficit present.      Mental Status: She is alert and oriented to person, place, and time. Mental status is at baseline.      Cranial Nerves: No cranial nerve deficit.      Sensory: No sensory deficit.      Coordination: Coordination normal.      Gait: Gait normal.   Psychiatric:         Mood and Affect: Mood normal.         Thought Content: Thought  content normal.         Judgment: Judgment normal.         Assessment/Plan   Problem List Items Addressed This Visit             ICD-10-CM    Essential hypertension I10    Relevant Medications    sotalol (Betapace) 80 mg tablet    losartan-hydrochlorothiazide (Hyzaar) 100-25 mg tablet    hydrALAZINE (Apresoline) 100 mg tablet    Obstructive sleep apnea, adult G47.33    Hypothyroidism E03.9     Other Visit Diagnoses         Codes    Encounter for Medicare annual wellness exam    -  Primary Z00.00    Routine general medical examination at [facility] facility     Z00.00              Advance Directives Discussion  16 - 20 minutes were spent discussing Advanced Care Planning (including a Living Will, Medical Power Of , as well as specific end of life choices and/or directives). The details of that discussion were documented in Advanced Directives Discussion section of the medical record.       Scribe Attestation  By signing my name below, IMaria M RMA , Emmanuel   attest that this documentation has been prepared under the direction and in the presence of Rupert Balderrama DO.   Provider Attestation - Scribe documentation    All medical record entries made by the Scribe were at my direction and personally dictated by me. I have reviewed the chart and agree that the record accurately reflects my personal performance of the history, physical exam, discussion and plan.

## 2024-08-02 NOTE — PATIENT INSTRUCTIONS
Follow up in 4 months     Continue current medications and therapy for chronic medical conditions.    Patient was advised importance of proper diet/nutrition in addition adequate hydration. Patient was encouraged moderate exercise program to include 30 minutes daily for 5 days of the week or 150 minutes weekly. Patient will follow-up with us as scheduled.    Review lab results from July 2024    Review echocardiogram from July 2024    Complete Medicare annual wellness physical/exam     Stop Losartan     Stop Hydrochlorothiazide     Start Losartan- Hydrochlorothiazide 100-25mg once daily     Take Hydralazine 100mg twice daily     Counseled on advanced directives/16 minutes

## 2024-08-03 RX ORDER — HYDRALAZINE HYDROCHLORIDE 100 MG/1
100 TABLET, FILM COATED ORAL 2 TIMES DAILY
Qty: 180 TABLET | Refills: 1 | Status: SHIPPED | OUTPATIENT
Start: 2024-08-03

## 2024-08-15 ENCOUNTER — OFFICE VISIT (OUTPATIENT)
Dept: PRIMARY CARE | Facility: CLINIC | Age: 79
End: 2024-08-15
Payer: MEDICARE

## 2024-08-15 VITALS
WEIGHT: 210 LBS | BODY MASS INDEX: 31.83 KG/M2 | HEIGHT: 68 IN | RESPIRATION RATE: 16 BRPM | HEART RATE: 56 BPM | TEMPERATURE: 98 F | DIASTOLIC BLOOD PRESSURE: 76 MMHG | OXYGEN SATURATION: 96 % | SYSTOLIC BLOOD PRESSURE: 142 MMHG

## 2024-08-15 DIAGNOSIS — B02.9 HERPES ZOSTER WITHOUT COMPLICATION: ICD-10-CM

## 2024-08-15 DIAGNOSIS — Z95.0 PRESENCE OF CARDIAC PACEMAKER: ICD-10-CM

## 2024-08-15 DIAGNOSIS — I48.0 PAROXYSMAL ATRIAL FIBRILLATION (MULTI): Primary | ICD-10-CM

## 2024-08-15 PROCEDURE — 3078F DIAST BP <80 MM HG: CPT | Performed by: FAMILY MEDICINE

## 2024-08-15 PROCEDURE — 1157F ADVNC CARE PLAN IN RCRD: CPT | Performed by: FAMILY MEDICINE

## 2024-08-15 PROCEDURE — G2211 COMPLEX E/M VISIT ADD ON: HCPCS | Performed by: FAMILY MEDICINE

## 2024-08-15 PROCEDURE — 1123F ACP DISCUSS/DSCN MKR DOCD: CPT | Performed by: FAMILY MEDICINE

## 2024-08-15 PROCEDURE — 99213 OFFICE O/P EST LOW 20 MIN: CPT | Performed by: FAMILY MEDICINE

## 2024-08-15 PROCEDURE — 1159F MED LIST DOCD IN RCRD: CPT | Performed by: FAMILY MEDICINE

## 2024-08-15 PROCEDURE — 3077F SYST BP >= 140 MM HG: CPT | Performed by: FAMILY MEDICINE

## 2024-08-15 PROCEDURE — 1160F RVW MEDS BY RX/DR IN RCRD: CPT | Performed by: FAMILY MEDICINE

## 2024-08-15 RX ORDER — PREGABALIN 75 MG/1
75 CAPSULE ORAL 2 TIMES DAILY
Qty: 60 CAPSULE | Refills: 0 | Status: SHIPPED | OUTPATIENT
Start: 2024-08-15

## 2024-08-15 RX ORDER — VALACYCLOVIR HYDROCHLORIDE 1 G/1
1000 TABLET, FILM COATED ORAL 3 TIMES DAILY
Qty: 21 TABLET | Refills: 0 | Status: SHIPPED | OUTPATIENT
Start: 2024-08-15 | End: 2024-08-22

## 2024-08-15 ASSESSMENT — ENCOUNTER SYMPTOMS
PHOTOPHOBIA: 0
CONFUSION: 0
COUGH: 0
HEADACHES: 0
SEIZURES: 0
SORE THROAT: 0
SINUS PAIN: 0
POLYDIPSIA: 0
CONSTIPATION: 0
PALPITATIONS: 0
ACTIVITY CHANGE: 0
ADENOPATHY: 0
NECK STIFFNESS: 0
DIZZINESS: 0
STRIDOR: 0
TROUBLE SWALLOWING: 0
ABDOMINAL DISTENTION: 0
NERVOUS/ANXIOUS: 0
CHEST TIGHTNESS: 0
MYALGIAS: 0
AGITATION: 0
COLOR CHANGE: 0
FLANK PAIN: 0
BLOOD IN STOOL: 0
FEVER: 0
DECREASED CONCENTRATION: 0
CONSTITUTIONAL NEGATIVE: 1
RECTAL PAIN: 0
EYE PAIN: 0
SPEECH DIFFICULTY: 0
SLEEP DISTURBANCE: 0
RHINORRHEA: 0
POLYPHAGIA: 0
ARTHRALGIAS: 0
DYSPHORIC MOOD: 0
ABDOMINAL PAIN: 1
FATIGUE: 0
SINUS PRESSURE: 0
SHORTNESS OF BREATH: 0
HEMATURIA: 0
APPETITE CHANGE: 0
DYSURIA: 0
DIARRHEA: 0

## 2024-08-15 NOTE — PROGRESS NOTES
Subjective   Patient ID: Daisy Borja is a 78 y.o. female who presents for Abdominal Pain and Rash.    Abdominal Pain  The current episode started in the past 7 days. The pain is located in the LUQ. The pain is at a severity of 6/10. The quality of the pain is sharp and dull. Pertinent negatives include no arthralgias, constipation, diarrhea, dysuria, fever, headaches, hematuria or myalgias.   Rash  Episode onset: 2 weeks. Location: Left rib area. The rash is characterized by redness and scaling. Pertinent negatives include no congestion, cough, diarrhea, eye pain, fatigue, fever, rhinorrhea, shortness of breath or sore throat. Past treatments include anti-itch cream and moisturizer. The treatment provided no relief.      Patient states this all started with a rash and then the stomach pain. She feels like all these symptoms started after she started taking the prescription losartan-(Hydrochlorothiazide.)     Review of Systems   Constitutional: Negative.  Negative for activity change, appetite change, fatigue and fever.   HENT:  Negative for congestion, dental problem, ear discharge, ear pain, mouth sores, rhinorrhea, sinus pressure, sinus pain, sore throat, tinnitus and trouble swallowing.    Eyes:  Negative for photophobia, pain and visual disturbance.   Respiratory:  Negative for cough, chest tightness, shortness of breath and stridor.    Cardiovascular:  Negative for chest pain and palpitations.   Gastrointestinal:  Positive for abdominal pain. Negative for abdominal distention, blood in stool, constipation, diarrhea and rectal pain.   Endocrine: Negative for cold intolerance, heat intolerance, polydipsia, polyphagia and polyuria.   Genitourinary:  Negative for dysuria, flank pain, hematuria and urgency.   Musculoskeletal:  Negative for arthralgias, gait problem, myalgias and neck stiffness.   Skin:  Positive for rash. Negative for color change.   Allergic/Immunologic: Negative for environmental allergies and food  "allergies.   Neurological:  Negative for dizziness, seizures, syncope, speech difficulty and headaches.   Hematological:  Negative for adenopathy.   Psychiatric/Behavioral:  Negative for agitation, confusion, decreased concentration, dysphoric mood and sleep disturbance. The patient is not nervous/anxious.        Objective   /76 (BP Location: Right arm, Patient Position: Sitting, BP Cuff Size: Adult)   Pulse 56   Temp 36.7 °C (98 °F) (Temporal)   Resp 16   Ht 1.727 m (5' 8\")   Wt 95.3 kg (210 lb)   SpO2 96%   BMI 31.93 kg/m²     Physical Exam  Vitals reviewed.   Constitutional:       General: She is not in acute distress.     Appearance: Normal appearance. She is obese. She is not ill-appearing or diaphoretic.   HENT:      Head: Normocephalic.      Right Ear: Tympanic membrane and external ear normal.      Left Ear: Tympanic membrane and external ear normal.      Nose: Nose normal. No congestion.      Mouth/Throat:      Pharynx: No posterior oropharyngeal erythema.   Eyes:      General:         Right eye: No discharge.         Left eye: No discharge.      Extraocular Movements: Extraocular movements intact.      Conjunctiva/sclera: Conjunctivae normal.      Pupils: Pupils are equal, round, and reactive to light.   Cardiovascular:      Rate and Rhythm: Normal rate and regular rhythm.      Pulses: Normal pulses.      Heart sounds: Normal heart sounds. No murmur heard.  Pulmonary:      Effort: Pulmonary effort is normal. No respiratory distress.      Breath sounds: Normal breath sounds. No wheezing or rales.   Chest:      Chest wall: No tenderness.   Abdominal:      General: Abdomen is flat. Bowel sounds are normal. There is no distension.      Palpations: There is no mass.      Tenderness: There is no abdominal tenderness. There is no guarding.   Musculoskeletal:         General: No tenderness. Normal range of motion.      Cervical back: Normal range of motion and neck supple. No tenderness.      Right " lower leg: No edema.      Left lower leg: No edema.   Skin:     General: Skin is dry.      Coloration: Skin is not jaundiced.      Findings: Erythema, lesion and rash present. No bruising.      Comments: Left flank rash at T11   Neurological:      General: No focal deficit present.      Mental Status: She is alert and oriented to person, place, and time. Mental status is at baseline.      Cranial Nerves: No cranial nerve deficit.      Sensory: No sensory deficit.      Coordination: Coordination normal.      Gait: Gait normal.   Psychiatric:         Mood and Affect: Mood normal.         Thought Content: Thought content normal.         Judgment: Judgment normal.         Assessment/Plan   Problem List Items Addressed This Visit             ICD-10-CM    Paroxysmal atrial fibrillation (Multi) - Primary I48.0    Presence of cardiac pacemaker Z95.0     Other Visit Diagnoses         Codes    Herpes zoster without complication     B02.9    Relevant Medications    valACYclovir (Valtrex) 1 gram tablet    pregabalin (Lyrica) 75 mg capsule          Scribe Attestation  By signing my name below, I, Rupert Balderrama DO , Scribe   attest that this documentation has been prepared under the direction and in the presence of Rupert Balderrama DO.    Provider Attestation - Scribe documentation    All medical record entries made by the Scribe were at my direction and personally dictated by me. I have reviewed the chart and agree that the record accurately reflects my personal performance of the history, physical exam, discussion and plan.

## 2024-08-15 NOTE — PATIENT INSTRUCTIONS
Follow up in 2 weeks    Continue current medications and therapy for chronic medical conditions.    Patient was advised importance of proper diet/nutrition in addition adequate hydration. Patient was encouraged moderate exercise program to include 30 minutes daily for 5 days of the week or 150 minutes weekly. Patient will follow-up with us as scheduled.    Start valtrex 1 gm three times daily x 7 days    Start pregabalin 75 mg twice daily    Topical acyclovir cream twice daily/[facility]

## 2024-09-10 ENCOUNTER — APPOINTMENT (OUTPATIENT)
Dept: CARDIOLOGY | Facility: CLINIC | Age: 79
End: 2024-09-10
Payer: MEDICARE

## 2024-09-16 ENCOUNTER — APPOINTMENT (OUTPATIENT)
Dept: CARDIOLOGY | Facility: CLINIC | Age: 79
End: 2024-09-16
Payer: MEDICARE

## 2024-09-16 DIAGNOSIS — Z95.0 CARDIAC PACEMAKER IN SITU: ICD-10-CM

## 2024-09-16 DIAGNOSIS — I49.5 SINOATRIAL NODE DYSFUNCTION (MULTI): ICD-10-CM

## 2024-09-16 PROCEDURE — 93280 PM DEVICE PROGR EVAL DUAL: CPT | Performed by: INTERNAL MEDICINE

## 2024-09-20 ENCOUNTER — APPOINTMENT (OUTPATIENT)
Dept: CARDIOLOGY | Facility: CLINIC | Age: 79
End: 2024-09-20
Payer: MEDICARE

## 2024-09-20 VITALS
BODY MASS INDEX: 31.83 KG/M2 | HEIGHT: 68 IN | WEIGHT: 210 LBS | DIASTOLIC BLOOD PRESSURE: 96 MMHG | HEART RATE: 60 BPM | SYSTOLIC BLOOD PRESSURE: 196 MMHG

## 2024-09-20 DIAGNOSIS — Z95.0 PRESENCE OF CARDIAC PACEMAKER: Primary | ICD-10-CM

## 2024-09-20 PROCEDURE — 1159F MED LIST DOCD IN RCRD: CPT | Performed by: INTERNAL MEDICINE

## 2024-09-20 PROCEDURE — 3080F DIAST BP >= 90 MM HG: CPT | Performed by: INTERNAL MEDICINE

## 2024-09-20 PROCEDURE — 1157F ADVNC CARE PLAN IN RCRD: CPT | Performed by: INTERNAL MEDICINE

## 2024-09-20 PROCEDURE — 1036F TOBACCO NON-USER: CPT | Performed by: INTERNAL MEDICINE

## 2024-09-20 PROCEDURE — 99213 OFFICE O/P EST LOW 20 MIN: CPT | Performed by: INTERNAL MEDICINE

## 2024-09-20 PROCEDURE — 3077F SYST BP >= 140 MM HG: CPT | Performed by: INTERNAL MEDICINE

## 2024-09-20 PROCEDURE — 1123F ACP DISCUSS/DSCN MKR DOCD: CPT | Performed by: INTERNAL MEDICINE

## 2024-09-20 NOTE — PROGRESS NOTES
Subjective:  The patient is a 78-year-old white female, followed primarily by Dr. Rupert Balderrama, who presents again for pacemaker follow-up.  She has a history of sinus node dysfunction and alleged intermittent second-degree AV block.  She underwent [facility] DDDR pacemaker placement in February 1997, with generator replacements in 2008 and in July 2019.  She currently has a [employer] generator.  Her ventricular lead has failed in the past few years, and was initially programmed unipolar which helps for a year or so.  Because the lead is totally failed with an impedance of greater than 3000, the patient was reprogrammed AAIR.  She has done well, with no lightheadedness or syncope.    The patient has a history of rare paroxysmal atrial fibrillation, for which she is on sotalol therapy.  She is not on anticoagulation, though this was to be started for any lengthy spells of fibrillation.  She is on diltiazem, losartan/hydrochlorothiazide and hydralazine for blood pressure control, but still has labile blood pressures.  Her history is also remarkable for degenerative joint disease in her left knee and some peripheral edema due to venous insufficiency.    The patient is  and lives at home with her , who recently had a melanoma resection from his back and is undergoing further staging.  They have 2 children and 6 grandchildren, some of whom live in [place] outside [facility].    Current Outpatient Medications   Medication Sig    dilTIAZem ER (Tiazac) 360 mg 24 hr capsule take 1 capsule by mouth every morning before breakfast    hydrALAZINE (Apresoline) 100 mg tablet Take 1 tablet (100 mg) by mouth 2 times a day.    losartan-hydrochlorothiazide (Hyzaar) 100-25 mg tablet Take 1 tablet by mouth once daily.    sotalol (Betapace) 80 mg tablet Take 1 tablet (80 mg) by mouth 2 times a day.     Allergies:  Amoxicillin, Penicillins, Beta-blockers (beta-adrenergic blocking agts), Nitrofurantoin, and  "Nystatin     Patient Active Problem List   Diagnosis    Essential hypertension    Edema    Elevated TSH    Hyperglycemia    Hyperlipidemia    Obstructive sleep apnea, adult    Pacemaker lead failure    Paroxysmal atrial fibrillation (Multi)    PVC (premature ventricular contraction)    Vitamin D deficiency    Carotid artery stenosis    Increased endometrial stripe thickness    Presence of cardiac pacemaker    Daytime somnolence    Hypothyroidism    Osteopenia     Past Surgical History:   Procedure Laterality Date    OTHER SURGICAL HISTORY  01/30/2022    Tonsillectomy    OTHER SURGICAL HISTORY  01/30/2022    Complete colonoscopy    OTHER SURGICAL HISTORY  11/02/2021    Pacemaker insertion     Objective:  Vitals:    09/20/24 0758   BP: (!) 196/96   Pulse: 60   Height:     1.727 m (5' 8\")  Weight: 95.3 kg (210 lb)     Exam:  Gen: Very pleasant woman in no distress, with elevated blood pressure as noted.  HEENT: No scleral icterus.  Neck: No jugular venous distention or thyromegaly.  Left subclavian pacemaker pocket: Oblique incisional scar with deep generator.  Lungs: Clear to auscultation, with no wheezes or rales.  Heart: Regular rhythm with positive S4 and grade 1/6 functional flow murmur along left sternal border with normal S2.  Abdomen: Benign, with no organomegaly or masses.  Extremities: Intact distal pulses; no more than trace bilateral ankle edema.  Neuro: No focal neurologic abnormalities.  Skin: No cutaneous lesions.    Device Check:  A pacemaker check on 9/16/2024 showed normal function in the AAIR mode from 60 to 120 bpm.  The atrial lead parameters were good and the battery longevity estimated at 4 years.  There is 52% atrial pacing.  There was a 0% atrial fibrillation burden.    Impressions:  1.  Chronic hypertension, poorly controlled today.  2.  Sinus node dysfunction, with effective AAIR pacing through current device.  3.  Status post prior DDDR pacemakers dating back to 1997, with total failure of " "ventricular lead.  Because of patient appears to have intact AV conduction, we have elected not to add a new ventricular lead until the current device is depleted.  4.  Paroxysmal atrial fibrillation, with minuscule burden of this rhythm on a \"rhythm control strategy\" with sotalol.  The patient has a [id number] score of at least 4 (hypertension, female gender, 2 points for age over 75), and will certainly be considered for full anticoagulation if she has any spells of atrial fibrillation that last more than a few hours in duration.  5.  Other medical problems, including obesity, degenerative joint disease, lower extremity venous insufficiency, and known mild carotid artery disease.    Recommendations:  1.  The patient's device will be checked remotely every 3 months.  2.  She will follow-up on an annual basis for pacemaker checks, which may include atrial pacing to at least 100 bpm to assure intact AV conduction at that rate.  3.  When she reaches the elective replacement indicator of her device, we will plan to perform a left upper extremity venogram, with the added addition of a new ventricular lead.  Her system will never be MRI-compatible anyway, given her old [employer] leads.  4.  She will follow-up with Dr. Balderrama in regard to her hypertension management, and could potentially benefit from nephrology involvement.      Davion Florentino MD   "

## 2024-11-27 ENCOUNTER — APPOINTMENT (OUTPATIENT)
Dept: PRIMARY CARE | Facility: CLINIC | Age: 79
End: 2024-11-27
Payer: MEDICARE

## 2024-12-02 ENCOUNTER — APPOINTMENT (OUTPATIENT)
Dept: PRIMARY CARE | Facility: CLINIC | Age: 79
End: 2024-12-02
Payer: MEDICARE

## 2024-12-13 DIAGNOSIS — I10 ESSENTIAL HYPERTENSION: ICD-10-CM

## 2024-12-13 RX ORDER — LOSARTAN POTASSIUM AND HYDROCHLOROTHIAZIDE 25; 100 MG/1; MG/1
1 TABLET ORAL DAILY
Qty: 90 TABLET | Refills: 0 | Status: SHIPPED | OUTPATIENT
Start: 2024-12-13

## 2024-12-13 NOTE — TELEPHONE ENCOUNTER
Recent Visits  Date Type Provider Dept   08/15/24 Office Visit Rupert Balderrama DO [facility]   08/02/24 Office Visit Rupert Balderrama, DO [facility]   Showing recent visits within past 180 days and meeting all other requirements  Future Appointments  No visits were found meeting these conditions.  Showing future appointments within next 90 days and meeting all other requirements

## 2024-12-18 ENCOUNTER — HOSPITAL ENCOUNTER (OUTPATIENT)
Dept: CARDIOLOGY | Facility: HOSPITAL | Age: 79
Discharge: HOME | End: 2024-12-18
Payer: MEDICARE

## 2024-12-18 DIAGNOSIS — Z95.0 PACEMAKER: ICD-10-CM

## 2024-12-18 DIAGNOSIS — I49.5 SICK SINUS SYNDROME (MULTI): ICD-10-CM

## 2024-12-18 PROCEDURE — 93296 REM INTERROG EVL PM/IDS: CPT

## 2025-01-07 DIAGNOSIS — I10 ESSENTIAL HYPERTENSION: ICD-10-CM

## 2025-01-07 RX ORDER — DILTIAZEM HYDROCHLORIDE 360 MG/1
360 CAPSULE, EXTENDED RELEASE ORAL DAILY
Qty: 90 CAPSULE | Refills: 0 | Status: SHIPPED | OUTPATIENT
Start: 2025-01-07

## 2025-01-07 NOTE — TELEPHONE ENCOUNTER
Dr Balderrama Pt     [facility] pharmacy ( [facility] ) call in requesting Refill for         dilTIAZem ER (Tiazac) 360 mg 24 hr capsule        Sig: take 1 capsule by mouth every morning before breakfast            Pharmacy    [id number] - [facility] [address] AT [facility] & [address], [facility] [address]  Phone: [phone]  Fax: [phone]  KENNA #: [id number]       Please assist

## 2025-02-12 ENCOUNTER — APPOINTMENT (OUTPATIENT)
Dept: DERMATOLOGY | Facility: CLINIC | Age: 80
End: 2025-02-12
Payer: MEDICARE

## 2025-03-03 ENCOUNTER — APPOINTMENT (OUTPATIENT)
Dept: PRIMARY CARE | Facility: CLINIC | Age: 80
End: 2025-03-03
Payer: MEDICARE

## 2025-03-03 VITALS
HEIGHT: 68 IN | SYSTOLIC BLOOD PRESSURE: 162 MMHG | RESPIRATION RATE: 19 BRPM | TEMPERATURE: 97.3 F | BODY MASS INDEX: 32.43 KG/M2 | OXYGEN SATURATION: 97 % | WEIGHT: 214 LBS | HEART RATE: 62 BPM | DIASTOLIC BLOOD PRESSURE: 78 MMHG

## 2025-03-03 DIAGNOSIS — I48.0 PAROXYSMAL ATRIAL FIBRILLATION (MULTI): ICD-10-CM

## 2025-03-03 DIAGNOSIS — Q82.8 ACCESSORY SKIN TAGS: ICD-10-CM

## 2025-03-03 DIAGNOSIS — G47.33 OBSTRUCTIVE SLEEP APNEA, ADULT: ICD-10-CM

## 2025-03-03 DIAGNOSIS — R73.9 HYPERGLYCEMIA: ICD-10-CM

## 2025-03-03 DIAGNOSIS — E03.9 ACQUIRED HYPOTHYROIDISM: ICD-10-CM

## 2025-03-03 DIAGNOSIS — I10 ESSENTIAL HYPERTENSION: Primary | ICD-10-CM

## 2025-03-03 DIAGNOSIS — E78.2 MIXED HYPERLIPIDEMIA: ICD-10-CM

## 2025-03-03 LAB
POC FINGERSTICK BLOOD GLUCOSE: 153 MG/DL (ref 70–100)
POC HEMOGLOBIN A1C: 6.2 % (ref 4.2–6.5)

## 2025-03-03 PROCEDURE — 83036 HEMOGLOBIN GLYCOSYLATED A1C: CPT | Performed by: FAMILY MEDICINE

## 2025-03-03 PROCEDURE — 99214 OFFICE O/P EST MOD 30 MIN: CPT | Performed by: FAMILY MEDICINE

## 2025-03-03 PROCEDURE — 82962 GLUCOSE BLOOD TEST: CPT | Performed by: FAMILY MEDICINE

## 2025-03-03 PROCEDURE — 11200 RMVL SKIN TAGS UP TO&INC 15: CPT | Performed by: FAMILY MEDICINE

## 2025-03-03 RX ORDER — LOSARTAN POTASSIUM AND HYDROCHLOROTHIAZIDE 25; 100 MG/1; MG/1
1 TABLET ORAL DAILY
Qty: 90 TABLET | Refills: 1 | Status: SHIPPED | OUTPATIENT
Start: 2025-03-03

## 2025-03-03 ASSESSMENT — ENCOUNTER SYMPTOMS
TROUBLE SWALLOWING: 0
STRIDOR: 0
NECK STIFFNESS: 0
SINUS PAIN: 0
COLOR CHANGE: 0
CONFUSION: 0
RHINORRHEA: 0
DYSPHORIC MOOD: 0
NERVOUS/ANXIOUS: 0
LOSS OF SENSATION IN FEET: 0
DIZZINESS: 0
FEVER: 0
BLOOD IN STOOL: 0
SINUS PRESSURE: 0
MYALGIAS: 0
FLANK PAIN: 0
SORE THROAT: 0
FATIGUE: 0
ARTHRALGIAS: 0
SPEECH DIFFICULTY: 0
SLEEP DISTURBANCE: 0
PALPITATIONS: 0
SHORTNESS OF BREATH: 0
OCCASIONAL FEELINGS OF UNSTEADINESS: 0
POLYPHAGIA: 0
CONSTITUTIONAL NEGATIVE: 1
DIARRHEA: 0
ABDOMINAL DISTENTION: 0
PHOTOPHOBIA: 0
AGITATION: 0
DYSURIA: 0
SEIZURES: 0
HYPERTENSION: 1
APPETITE CHANGE: 0
ADENOPATHY: 0
POLYDIPSIA: 0
DEPRESSION: 0
ABDOMINAL PAIN: 0
CHEST TIGHTNESS: 0
EYE PAIN: 0
HEADACHES: 1
DECREASED CONCENTRATION: 0
RECTAL PAIN: 0
HEMATURIA: 0
CONSTIPATION: 0
ACTIVITY CHANGE: 0
COUGH: 0

## 2025-03-03 NOTE — PATIENT INSTRUCTIONS
Follow up in 2 months    Call back tomorrow to check how she is doing.    Continue current medications and therapy for chronic medical conditions.    Patient was advised importance of proper diet/nutrition in addition adequate hydration. Patient was encouraged moderate exercise program to include 30 minutes daily for 5 days of the week or 150 minutes weekly. Patient will follow-up with us as scheduled.    Liq N2 applied to skin tag on upper left eyelid    Obtain fasting lipid, CBC, CMP, magnesium labs    In office Accu-Chek and hemoglobin A1c    Accessory skin tags to left upper eyelid

## 2025-03-03 NOTE — PROGRESS NOTES
Subjective   Patient ID: Daisy Borja is a 79 y.o. female who presents for Hypertension.    Patient states experiencing headache, onset yesterday. Other symptoms included nausea and unsteady feeling. Denies dizziness.    Patient would like to have a skin tag on her left upper eyelid be checked and froze if possible.     Patient denies any other symptoms or concerns today.    Hypertension  This is a recurrent problem. The current episode started more than 1 year ago. The problem is unchanged. Associated symptoms include headaches. Pertinent negatives include no chest pain, palpitations or shortness of breath. There are no associated agents to hypertension. There are no known risk factors for coronary artery disease.        Review of Systems   Constitutional: Negative.  Negative for activity change, appetite change, fatigue and fever.   HENT:  Negative for congestion, dental problem, ear discharge, ear pain, mouth sores, rhinorrhea, sinus pressure, sinus pain, sore throat, tinnitus and trouble swallowing.    Eyes:  Negative for photophobia, pain and visual disturbance.   Respiratory:  Negative for cough, chest tightness, shortness of breath and stridor.    Cardiovascular:  Negative for chest pain and palpitations.   Gastrointestinal:  Negative for abdominal distention, abdominal pain, blood in stool, constipation, diarrhea and rectal pain.   Endocrine: Negative for cold intolerance, heat intolerance, polydipsia, polyphagia and polyuria.   Genitourinary:  Negative for dysuria, flank pain, hematuria and urgency.   Musculoskeletal:  Negative for arthralgias, gait problem, myalgias and neck stiffness.   Skin:  Negative for color change and rash.   Allergic/Immunologic: Negative for environmental allergies and food allergies.   Neurological:  Positive for headaches. Negative for dizziness, seizures, syncope and speech difficulty.   Hematological:  Negative for adenopathy.   Psychiatric/Behavioral:  Negative for agitation,  "confusion, decreased concentration, dysphoric mood and sleep disturbance. The patient is not nervous/anxious.        Objective   /78 (BP Location: Right arm, Patient Position: Sitting, BP Cuff Size: Adult)   Pulse 62   Temp 36.3 °C (97.3 °F) (Skin)   Resp 19   Ht 1.727 m (5' 8\")   Wt 97.1 kg (214 lb)   SpO2 97%   BMI 32.54 kg/m²     Physical Exam  Vitals reviewed.   Constitutional:       General: She is not in acute distress.     Appearance: She is obese. She is not ill-appearing or diaphoretic.   HENT:      Head: Normocephalic.      Right Ear: Tympanic membrane and external ear normal.      Left Ear: Tympanic membrane and external ear normal.      Nose: Nose normal. No congestion.      Mouth/Throat:      Pharynx: No posterior oropharyngeal erythema.   Eyes:      General:         Right eye: No discharge.         Left eye: No discharge.      Extraocular Movements: Extraocular movements intact.      Conjunctiva/sclera: Conjunctivae normal.      Pupils: Pupils are equal, round, and reactive to light.   Cardiovascular:      Rate and Rhythm: Normal rate and regular rhythm.      Pulses: Normal pulses.      Heart sounds: Normal heart sounds. No murmur heard.  Pulmonary:      Effort: Pulmonary effort is normal. No respiratory distress.      Breath sounds: Normal breath sounds. No wheezing or rales.   Chest:      Chest wall: No tenderness.   Abdominal:      General: Bowel sounds are normal. There is distension.      Palpations: There is no mass.      Tenderness: There is no abdominal tenderness. There is no guarding.   Musculoskeletal:         General: No tenderness. Normal range of motion.      Cervical back: Normal range of motion and neck supple. No tenderness.      Right lower leg: No edema.      Left lower leg: No edema.   Skin:     General: Skin is dry.      Coloration: Skin is not jaundiced.      Findings: No bruising, erythema or rash.   Neurological:      General: No focal deficit present.      Mental " Status: She is alert and oriented to person, place, and time. Mental status is at baseline.      Cranial Nerves: No cranial nerve deficit.      Sensory: No sensory deficit.      Coordination: Coordination normal.      Gait: Gait normal.   Psychiatric:         Thought Content: Thought content normal.         Judgment: Judgment normal.      Comments: Anxious     Patient ID: Daisy Borja is a 79 y.o. female.    Destruction of lesion    Date/Time: 3/3/2025 2:23 PM    Performed by: Rupert Balderrama DO  Authorized by: Rupert Balderrama DO    Number of Lesions: 1  Lesion 1:     Body area: head/neck    Head/neck location: L eyelid    Malignancy: benign lesion      Destruction method: cryotherapy        Assessment/Plan   Problem List Items Addressed This Visit             ICD-10-CM    Essential hypertension - Primary I10    Relevant Medications    losartan-hydrochlorothiazide (Hyzaar) 100-25 mg tablet    Other Relevant Orders    CBC and Auto Differential    Magnesium    Hyperglycemia R73.9    Relevant Orders    POCT glycosylated hemoglobin (Hb A1C) manually resulted (Completed)    POCT fingerstick glucose manually resulted (Completed)    Comprehensive Metabolic Panel    Hyperlipidemia E78.5    Relevant Orders    Lipid Panel    Obstructive sleep apnea, adult G47.33    Paroxysmal atrial fibrillation (Multi) I48.0     Stable/monitored         Hypothyroidism E03.9     Scribe Attestation  By signing my name below, I, Zina Verma MA , Scribe   attest that this documentation has been prepared under the direction and in the presence of Rupert Balderrama DO.    Provider Attestation - Scribe documentation    All medical record entries made by the Scribe were at my direction and personally dictated by me. I have reviewed the chart and agree that the record accurately reflects my personal performance of the history, physical exam, discussion and plan.

## 2025-03-05 ENCOUNTER — TELEPHONE (OUTPATIENT)
Dept: PRIMARY CARE | Facility: CLINIC | Age: 80
End: 2025-03-05
Payer: MEDICARE

## 2025-03-22 LAB
ALBUMIN SERPL-MCNC: 4.4 G/DL (ref 3.6–5.1)
ALP SERPL-CCNC: 61 U/L (ref 37–153)
ALT SERPL-CCNC: 19 U/L (ref 6–29)
ANION GAP SERPL CALCULATED.4IONS-SCNC: 9 MMOL/L (CALC) (ref 7–17)
AST SERPL-CCNC: 17 U/L (ref 10–35)
BASOPHILS # BLD AUTO: 38 CELLS/UL (ref 0–200)
BASOPHILS NFR BLD AUTO: 0.6 %
BILIRUB SERPL-MCNC: 0.5 MG/DL (ref 0.2–1.2)
BUN SERPL-MCNC: 13 MG/DL (ref 7–25)
CALCIUM SERPL-MCNC: 9.6 MG/DL (ref 8.6–10.4)
CHLORIDE SERPL-SCNC: 99 MMOL/L (ref 98–110)
CHOLEST SERPL-MCNC: 168 MG/DL
CHOLEST/HDLC SERPL: 2.9 (CALC)
CO2 SERPL-SCNC: 29 MMOL/L (ref 20–32)
CREAT SERPL-MCNC: 0.81 MG/DL (ref 0.6–1)
EGFRCR SERPLBLD CKD-EPI 2021: 74 ML/MIN/1.73M2
EOSINOPHIL # BLD AUTO: 58 CELLS/UL (ref 15–500)
EOSINOPHIL NFR BLD AUTO: 0.9 %
ERYTHROCYTE [DISTWIDTH] IN BLOOD BY AUTOMATED COUNT: 12 % (ref 11–15)
GLUCOSE SERPL-MCNC: 130 MG/DL (ref 65–99)
HCT VFR BLD AUTO: 41.8 % (ref 35–45)
HDLC SERPL-MCNC: 58 MG/DL
HGB BLD-MCNC: 13.9 G/DL (ref 11.7–15.5)
LDLC SERPL CALC-MCNC: 93 MG/DL (CALC)
LYMPHOCYTES # BLD AUTO: 2080 CELLS/UL (ref 850–3900)
LYMPHOCYTES NFR BLD AUTO: 32.5 %
MAGNESIUM SERPL-MCNC: 1.9 MG/DL (ref 1.5–2.5)
MCH RBC QN AUTO: 31.2 PG (ref 27–33)
MCHC RBC AUTO-ENTMCNC: 33.3 G/DL (ref 32–36)
MCV RBC AUTO: 93.9 FL (ref 80–100)
MONOCYTES # BLD AUTO: 435 CELLS/UL (ref 200–950)
MONOCYTES NFR BLD AUTO: 6.8 %
NEUTROPHILS # BLD AUTO: 3789 CELLS/UL (ref 1500–7800)
NEUTROPHILS NFR BLD AUTO: 59.2 %
NONHDLC SERPL-MCNC: 110 MG/DL (CALC)
PLATELET # BLD AUTO: 284 THOUSAND/UL (ref 140–400)
PMV BLD REES-ECKER: 11.4 FL (ref 7.5–12.5)
POTASSIUM SERPL-SCNC: 4 MMOL/L (ref 3.5–5.3)
PROT SERPL-MCNC: 6.5 G/DL (ref 6.1–8.1)
RBC # BLD AUTO: 4.45 MILLION/UL (ref 3.8–5.1)
SODIUM SERPL-SCNC: 137 MMOL/L (ref 135–146)
TRIGL SERPL-MCNC: 79 MG/DL
WBC # BLD AUTO: 6.4 THOUSAND/UL (ref 3.8–10.8)

## 2025-03-30 DIAGNOSIS — I10 ESSENTIAL HYPERTENSION: ICD-10-CM

## 2025-03-31 ENCOUNTER — APPOINTMENT (OUTPATIENT)
Dept: PRIMARY CARE | Facility: CLINIC | Age: 80
End: 2025-03-31
Payer: MEDICARE

## 2025-03-31 VITALS
SYSTOLIC BLOOD PRESSURE: 150 MMHG | HEIGHT: 68 IN | WEIGHT: 215 LBS | HEART RATE: 70 BPM | DIASTOLIC BLOOD PRESSURE: 64 MMHG | BODY MASS INDEX: 32.58 KG/M2 | RESPIRATION RATE: 16 BRPM | TEMPERATURE: 97.8 F | OXYGEN SATURATION: 97 %

## 2025-03-31 DIAGNOSIS — E78.2 MIXED HYPERLIPIDEMIA: ICD-10-CM

## 2025-03-31 DIAGNOSIS — Z95.0 PRESENCE OF CARDIAC PACEMAKER: ICD-10-CM

## 2025-03-31 DIAGNOSIS — I10 ESSENTIAL HYPERTENSION: Primary | ICD-10-CM

## 2025-03-31 DIAGNOSIS — I48.0 PAROXYSMAL ATRIAL FIBRILLATION (MULTI): ICD-10-CM

## 2025-03-31 DIAGNOSIS — G47.33 OBSTRUCTIVE SLEEP APNEA, ADULT: ICD-10-CM

## 2025-03-31 DIAGNOSIS — E03.9 ACQUIRED HYPOTHYROIDISM: ICD-10-CM

## 2025-03-31 PROCEDURE — G2211 COMPLEX E/M VISIT ADD ON: HCPCS | Performed by: FAMILY MEDICINE

## 2025-03-31 PROCEDURE — 99214 OFFICE O/P EST MOD 30 MIN: CPT | Performed by: FAMILY MEDICINE

## 2025-03-31 RX ORDER — SOTALOL HYDROCHLORIDE 80 MG/1
TABLET ORAL
Qty: 180 TABLET | Refills: 1 | Status: SHIPPED | OUTPATIENT
Start: 2025-03-31

## 2025-03-31 RX ORDER — DILTIAZEM HYDROCHLORIDE 360 MG/1
360 CAPSULE, EXTENDED RELEASE ORAL DAILY
Qty: 90 CAPSULE | Refills: 1 | Status: SHIPPED | OUTPATIENT
Start: 2025-03-31

## 2025-03-31 ASSESSMENT — ENCOUNTER SYMPTOMS
ADENOPATHY: 0
HEADACHES: 0
RHINORRHEA: 0
CONFUSION: 0
MYALGIAS: 0
DYSURIA: 0
SINUS PRESSURE: 0
DIARRHEA: 0
NERVOUS/ANXIOUS: 1
ABDOMINAL DISTENTION: 0
DIZZINESS: 0
POLYPHAGIA: 0
DYSPHORIC MOOD: 0
TROUBLE SWALLOWING: 0
NECK STIFFNESS: 0
BLOOD IN STOOL: 0
COUGH: 0
APPETITE CHANGE: 0
PALPITATIONS: 0
ARTHRALGIAS: 1
SPEECH DIFFICULTY: 0
FATIGUE: 0
CONSTITUTIONAL NEGATIVE: 1
SINUS PAIN: 0
ACTIVITY CHANGE: 0
HEMATURIA: 0
EYE PAIN: 0
ABDOMINAL PAIN: 0
DECREASED CONCENTRATION: 0
SEIZURES: 0
FLANK PAIN: 0
FEVER: 0
SLEEP DISTURBANCE: 0
RECTAL PAIN: 0
AGITATION: 0
CHEST TIGHTNESS: 0
CONSTIPATION: 0
PHOTOPHOBIA: 0
SORE THROAT: 0
POLYDIPSIA: 0
STRIDOR: 0
COLOR CHANGE: 0
SHORTNESS OF BREATH: 0

## 2025-03-31 NOTE — PROGRESS NOTES
Subjective   Patient ID: Daisy Borja is a 79 y.o. female who presents for Results (Patient would like to go over blood work results ).  History of Present Illness  Daisy Borja is a 79 year old female who presents for a routine follow-up visit.    She is currently on diltiazem 360 mg for hypertension and requires a refill. She tolerates the medication well without experiencing constipation. Blood pressure monitoring at home is sporadic, but she has not reported any issues.    Her fasting blood sugar is 130 mg/dL, and her A1c is 5.7%, indicating prediabetes. She is managing this through dietary changes, particularly by reducing carbohydrate intake. Her BMI is 32, and she is attempting to lose weight through lifestyle modifications, including increased physical activity such as walking. Since her 's illness, she has not been able to engage in much physical activity, but she is trying to walk more as the weather improves.    Her cholesterol levels are well controlled with a total cholesterol of 160 mg/dL, HDL of 58 mg/dL, triglycerides of 79 mg/dL, and LDL of 93 mg/dL. The cholesterol to HDL ratio is 2.9.    She is currently taking magnesium glycinate 250 mg and is considering adjusting the dose due to previous issues with magnesium oxide causing diarrhea. Her magnesium level is 1.9 mg/dL, which is within the normal range.    She had shingles last summer and received the Zostavax vaccine in 2012. She is considering the newer shingles vaccine but is undecided. She has not received a COVID vaccine and is not currently interested in it.    She inquires about the necessity of mammograms at her age and mentions having cataract surgery in January of the previous year. She has not had a recent eye exam since the surgery. Her  has heart failure and myocarditis and is on medications including Entresto, Farxiga, and metoprolol.    No chest pain over the winter months and no lightheadedness when standing up.    Review  "of Systems   Constitutional: Negative.  Negative for activity change, appetite change, fatigue and fever.   HENT:  Negative for congestion, dental problem, ear discharge, ear pain, mouth sores, rhinorrhea, sinus pressure, sinus pain, sore throat, tinnitus and trouble swallowing.    Eyes:  Negative for photophobia, pain and visual disturbance.   Respiratory:  Negative for cough, chest tightness, shortness of breath and stridor.    Cardiovascular:  Negative for chest pain and palpitations.   Gastrointestinal:  Negative for abdominal distention, abdominal pain, blood in stool, constipation, diarrhea and rectal pain.   Endocrine: Negative for cold intolerance, heat intolerance, polydipsia, polyphagia and polyuria.   Genitourinary:  Negative for dysuria, flank pain, hematuria and urgency.   Musculoskeletal:  Positive for arthralgias. Negative for gait problem, myalgias and neck stiffness.   Skin:  Negative for color change and rash.   Allergic/Immunologic: Negative for environmental allergies and food allergies.   Neurological:  Negative for dizziness, seizures, syncope, speech difficulty and headaches.   Hematological:  Negative for adenopathy.   Psychiatric/Behavioral:  Negative for agitation, confusion, decreased concentration, dysphoric mood and sleep disturbance. The patient is nervous/anxious.        Objective     /64 (BP Location: Right arm, Patient Position: Sitting)   Pulse 70   Temp 36.6 °C (97.8 °F) (Skin)   Resp 16   Ht 1.727 m (5' 8\")   Wt 97.5 kg (215 lb)   SpO2 97%   BMI 32.69 kg/m²      Physical Exam  MEASUREMENTS: BMI- 32.0.  GENERAL: Alert, cooperative, well developed, no acute distress  HEENT: Normocephalic, normal oropharynx, moist mucous membranes  CHEST: Clear to auscultation bilaterally, No wheezes, rhonchi, or crackles  CARDIOVASCULAR: Normal heart rate and rhythm, S1 and S2 normal without murmurs  ABDOMEN: Soft, non-tender, non-distended, without organomegaly, Normal bowel " sounds  EXTREMITIES: No cyanosis or edema  NEUROLOGICAL: Cranial nerves grossly intact, Moves all extremities without gross motor or sensory deficit  SKIN: Normal    Assessment & Plan  Hypertension    She is on diltiazem 360 mg for hypertension and is tolerating it well without constipation or other side effects. Blood pressure monitoring at home is not regular but no issues were reported.    - Refill diltiazem 360 mg prescription at [facility] in [facility].      Prediabetes    Her fasting blood sugar was 130 mg/dL, and her A1c indicates prediabetes (5.7-6.4%). She prefers to manage her condition through dietary changes rather than medication. There is no family history of diabetes, and she is aware of the need to reduce carbohydrate intake. Metformin was discussed as an option, but she prefers dietary management. Fifty percent of individuals can control prediabetes with diet alone.    - Advise dietary modifications to reduce carbohydrate intake.    - Monitor blood sugar levels regularly.      Obesity    Her BMI is 32, indicating obesity. She is aware of weight loss medications like Wegovy and Zepbound but prefers lifestyle modifications. These medications can aid in weight loss and improve conditions like sleep apnea and cardiovascular health without causing hypoglycemia. She prefers to attempt weight loss through lifestyle changes.    - Encourage lifestyle modifications including a walking program and swimming.      Hyperlipidemia    Her cholesterol levels are well-controlled with total cholesterol at 160 mg/dL, HDL at 58 mg/dL, triglycerides at 79 mg/dL, and LDL at 93 mg/dL. The cholesterol ratio is 2.9, which is within the desired range.      Magnesium Supplementation    Her magnesium level is 1.9 mg/dL, which is within the normal range. She is currently taking magnesium glycinate 250 mg and experiences diarrhea with magnesium oxide. She can consider increasing the glycinate dose to 400 mg every other day if  tolerated.    - Advise to double magnesium glycinate dose every other day if tolerated.      Shingles    She had shingles last summer and received the Zostavax vaccine in 2012, which is about 60% effective. The new Shingrix vaccine, which is 95% effective and a killed vaccine, was discussed as an option to reduce the risk of recurrence.    - Consider Shingrix vaccine for better protection against shingles.      General Health Maintenance    She inquired about mammograms and was informed that they are typically done up to age 75 unless there is a strong family history or symptoms. She had cataract surgery last year and should follow up with an eye exam. The pneumonia vaccine and COVID-19 vaccine were discussed, but she opted to wait.    - Consider mammogram if there are symptoms or family history.    - Schedule follow-up eye exam post-cataract surgery.    - Consider updating pneumonia vaccine.    Results  Procedure: Skin tag removal  Description: Skin tag on the upper left area was removed.    LABS  Total cholesterol: 160 mg/dL (03/21/2025)  HDL: 58 mg/dL (03/21/2025)  Triglycerides: 79 mg/dL (03/21/2025)  LDL: 93 mg/dL (03/21/2025)  Cholesterol/HDL ratio: 2.9 (03/21/2025)  Magnesium: 1.9 mg/dL (03/21/2025)  WBC: 6.4 x10^3/µL (03/21/2025)  Hemoglobin: 13.9 g/dL (03/21/2025)  Hematocrit: 41.8% (03/21/2025)  Platelets: 284 x10^3/µL (03/21/2025)  Blood glucose: 130 mg/dL (03/21/2025)  BUN: 13 mg/dL (03/21/2025)  Creatinine: 0.81 mg/dL (03/21/2025)  GFR: 74 mL/min/1.73m^2 (03/21/2025)       Rupert Balderrama DO     This medical note was created with the assistance of artificial intelligence (AI) for documentation purposes. The content has been reviewed and confirmed by the healthcare provider for accuracy and completeness. Patient consented to the use of audio recording and use of AI during their visit.

## 2025-03-31 NOTE — TELEPHONE ENCOUNTER
Recent Visits  Date Type Provider Dept   03/03/25 [facility]   Showing recent visits within past 180 days and meeting all other requirements  Today's Visits  Date Type Provider Dept   03/31/25 App[facility]ment [facility]   Showing today's visits and meeting all other requirements  Future Appointments  No visits were found meeting these conditions.  Showing future appointments within next 90 days and meeting all other requirements

## 2025-04-01 NOTE — PATIENT INSTRUCTIONS
VISIT SUMMARY:  Today, you had a routine follow-up visit to review your current health status and medications. We discussed your blood pressure, blood sugar levels, weight management, cholesterol, magnesium supplementation, and vaccination status. You are managing well with your current medications and lifestyle changes.    YOUR PLAN:  -HYPERTENSION: Hypertension means high blood pressure. You are currently taking diltiazem 360 mg, which you tolerate well. We have refilled your prescription, and you should continue taking it as directed.    -PREDIABETES: Prediabetes means your blood sugar levels are higher than normal but not high enough to be classified as diabetes. Your fasting blood sugar is 130 mg/dL, and your A1c is 5.7%. You are managing this through dietary changes, and we recommend you continue to reduce carbohydrate intake and monitor your blood sugar levels regularly.    -OBESITY: Obesity means having a body mass index (BMI) of 30 or higher. Your BMI is 32. You are working on weight loss through lifestyle changes like walking and swimming. We encourage you to continue these activities to help with weight loss and overall health.    -HYPERLIPIDEMIA: Hyperlipidemia means having high levels of fats (lipids) in your blood. Your cholesterol levels are well-controlled, so no changes are needed at this time.    -MAGNESIUM SUPPLEMENTATION: You are taking magnesium glycinate 250 mg for supplementation. Your magnesium level is normal at 1.9 mg/dL. If you tolerate it well, you can consider increasing the dose to 400 mg every other day.    -SHINGLES: Shingles is a viral infection that causes a painful rash. You had shingles last summer and received the Zostavax vaccine in 2012. We discussed the newer Shingrix vaccine, which is more effective. You may consider getting this vaccine for better protection.    -GENERAL HEALTH MAINTENANCE: We discussed the necessity of mammograms, which are typically done up to age 75  unless there is a strong family history or symptoms. You should schedule a follow-up eye exam post-cataract surgery. We also discussed the pneumonia and COVID-19 vaccines, but you opted to wait.    INSTRUCTIONS:  Refill your diltiazem 360 mg prescription at [facility] in [facility]. Continue with dietary modifications to manage prediabetes and monitor your blood sugar levels regularly. Maintain your walking and swimming routine for weight loss. If tolerated, increase your magnesium glycinate dose to 400 mg every other day. Consider getting the Shingrix vaccine for better protection against shingles. Schedule a follow-up eye exam post-cataract surgery. Consider a mammogram if you have symptoms or a strong family history. Consider updating your pneumonia vaccine.

## 2025-04-02 ENCOUNTER — HOSPITAL ENCOUNTER (OUTPATIENT)
Dept: CARDIOLOGY | Facility: HOSPITAL | Age: 80
Discharge: HOME | End: 2025-04-02
Payer: MEDICARE

## 2025-04-02 DIAGNOSIS — I49.5 SICK SINUS SYNDROME (MULTI): ICD-10-CM

## 2025-04-02 DIAGNOSIS — Z95.0 PACEMAKER: ICD-10-CM

## 2025-04-02 PROCEDURE — 93296 REM INTERROG EVL PM/IDS: CPT

## 2025-04-07 DIAGNOSIS — I10 ESSENTIAL HYPERTENSION: ICD-10-CM

## 2025-04-07 RX ORDER — DILTIAZEM HYDROCHLORIDE 360 MG/1
360 CAPSULE, EXTENDED RELEASE ORAL DAILY
Qty: 90 CAPSULE | Refills: 1 | Status: SHIPPED | OUTPATIENT
Start: 2025-04-07

## 2025-04-07 NOTE — TELEPHONE ENCOUNTER
PT PAULETTE KLEIN     PT CALLED IN B/C THE [facility] THE SCRIPT WAS SENT ISN'T PREFERRED BY HER INSURANCE AND WOULD LIKE IT TO BE SENT OVER TO ANOTHER PHARMACY     TIAZAC     [facility] [address]

## 2025-05-25 DIAGNOSIS — I10 ESSENTIAL HYPERTENSION: ICD-10-CM

## 2025-05-27 RX ORDER — HYDRALAZINE HYDROCHLORIDE 100 MG/1
100 TABLET, FILM COATED ORAL 2 TIMES DAILY
Qty: 180 TABLET | Refills: 1 | Status: SHIPPED | OUTPATIENT
Start: 2025-05-27

## 2025-05-27 NOTE — TELEPHONE ENCOUNTER
Recent Visits  Date Type Provider Dept   03/31/25 Office Visit Rupert Balderrama, DO [facility]   03/03/25 Office Visit Rupert Balderrama, DO [facility]   Showing recent visits within past 180 days and meeting all other requirements  Future Appointments  Date Type Provider Dept   07/31/25 Appointment Rupert Balderrama,  [facility]   Showing future appointments within next 90 days and meeting all other requirements       60

## 2025-07-09 DIAGNOSIS — I10 ESSENTIAL HYPERTENSION: ICD-10-CM

## 2025-07-09 RX ORDER — SOTALOL HYDROCHLORIDE 80 MG/1
TABLET ORAL
Qty: 180 TABLET | Refills: 0 | Status: SHIPPED | OUTPATIENT
Start: 2025-07-09

## 2025-07-09 NOTE — TELEPHONE ENCOUNTER
Recent Visits  Date Type Provider Dept   03/31/25 Office Visit Rupert Balderrama, DO [facility]   03/03/25 Office Visit Rupert Balderrama, DO [facility]   Showing recent visits within past 180 days and meeting all other requirements  Future Appointments  Date Type Provider Dept   07/31/25 Appointment Rupert Balderrama,  [facility]   Showing future appointments within next 90 days and meeting all other requirements

## 2025-07-11 ENCOUNTER — OFFICE VISIT (OUTPATIENT)
Dept: URGENT CARE | Age: 80
End: 2025-07-11
Payer: MEDICARE

## 2025-07-11 VITALS
RESPIRATION RATE: 20 BRPM | DIASTOLIC BLOOD PRESSURE: 76 MMHG | HEIGHT: 68 IN | TEMPERATURE: 98 F | SYSTOLIC BLOOD PRESSURE: 150 MMHG | BODY MASS INDEX: 31.83 KG/M2 | OXYGEN SATURATION: 97 % | WEIGHT: 210 LBS | HEART RATE: 86 BPM

## 2025-07-11 DIAGNOSIS — H65.03 NON-RECURRENT ACUTE SEROUS OTITIS MEDIA OF BOTH EARS: ICD-10-CM

## 2025-07-11 DIAGNOSIS — J01.00 ACUTE NON-RECURRENT MAXILLARY SINUSITIS: Primary | ICD-10-CM

## 2025-07-11 PROCEDURE — 3078F DIAST BP <80 MM HG: CPT | Performed by: FAMILY MEDICINE

## 2025-07-11 PROCEDURE — 3077F SYST BP >= 140 MM HG: CPT | Performed by: FAMILY MEDICINE

## 2025-07-11 PROCEDURE — 1123F ACP DISCUSS/DSCN MKR DOCD: CPT | Performed by: FAMILY MEDICINE

## 2025-07-11 PROCEDURE — 1159F MED LIST DOCD IN RCRD: CPT | Performed by: FAMILY MEDICINE

## 2025-07-11 PROCEDURE — 1157F ADVNC CARE PLAN IN RCRD: CPT | Performed by: FAMILY MEDICINE

## 2025-07-11 PROCEDURE — 99203 OFFICE O/P NEW LOW 30 MIN: CPT | Performed by: FAMILY MEDICINE

## 2025-07-11 RX ORDER — AZITHROMYCIN 250 MG/1
TABLET, FILM COATED ORAL
Qty: 6 TABLET | Refills: 0 | Status: SHIPPED | OUTPATIENT
Start: 2025-07-11

## 2025-07-11 NOTE — PROGRESS NOTES
"Subjective   Patient ID: Daisy Borja is a 79 y.o. female who is here with her . She presents today with a chief complaint of Sinusitis (X4 days /Feels it in the back of the head ).    History of Present Illness  Subjective  Daisy Borja is a 79 y.o. female who presents for evaluation of symptoms of a URI. Symptoms include headache described as pressure, nasal congestion, and sinus pressure. Onset of symptoms was 4 days ago and has been unchanged since that time. Treatment to date: Tylenol.          Sinusitis      Past Medical History  Allergies as of 07/11/2025 - Reviewed 07/11/2025   Allergen Reaction Noted    Amoxicillin Anaphylaxis 11/11/2011    Penicillins Anaphylaxis and Unknown 08/17/2010    Beta-blockers (beta-adrenergic blocking agts) Unknown 01/12/2015    Nitrofurantoin Unknown 11/16/2011    Nystatin Rash 06/07/2017       Prescriptions Prior to Admission[1]     Medical History[2]    Surgical History[3]     reports that she has never smoked. She has never used smokeless tobacco. She reports current alcohol use of about 2.0 standard drinks of alcohol per week. She reports that she does not use drugs.    Review of Systems  Review of Systems                               Objective    Vitals:    07/11/25 1224   BP: 150/76   Pulse: 86   Resp: 20   Temp: 36.7 °C (98 °F)   SpO2: 97%   Weight: 95.3 kg (210 lb)   Height: 1.727 m (5' 8\")     No LMP recorded (lmp unknown). Patient is postmenopausal.    Physical Exam  Vitals and nursing note reviewed.   Constitutional:       General: She is not in acute distress.     Appearance: Normal appearance. She is not ill-appearing, toxic-appearing or diaphoretic.   HENT:      Right Ear: Ear canal and external ear normal. A middle ear effusion is present.      Left Ear: Ear canal and external ear normal. A middle ear effusion is present.      Nose: Congestion and rhinorrhea present.      Mouth/Throat:      Mouth: Mucous membranes are moist.      Pharynx: Oropharynx is clear. "   Eyes:      Extraocular Movements: Extraocular movements intact.      Conjunctiva/sclera: Conjunctivae normal.      Pupils: Pupils are equal, round, and reactive to light.   Cardiovascular:      Rate and Rhythm: Normal rate and regular rhythm.      Pulses: Normal pulses.      Heart sounds: Normal heart sounds.   Pulmonary:      Effort: Pulmonary effort is normal.      Breath sounds: Normal breath sounds. No wheezing, rhonchi or rales.   Musculoskeletal:      Cervical back: Normal range of motion and neck supple. No rigidity or tenderness.   Lymphadenopathy:      Cervical: No cervical adenopathy.   Neurological:      Mental Status: She is alert.         Procedures    Point of Care Test & Imaging Results from this visit  No results found for this visit on 07/11/25.   Imaging  No results found.    Cardiology, Vascular, and Other Imaging  No other imaging results found for the past 2 days      Diagnostic study results (if any) were reviewed by Mary Dumont MD.    Assessment/Plan   Allergies, medications, history, and pertinent labs/EKGs/Imaging reviewed by Mary Dumont MD.     Medical Decision Making      Orders and Diagnoses  There are no diagnoses linked to this encounter.    Medical Admin Record      Patient disposition: Home    Electronically signed by Mary Dumont MD  12:36 PM           [1] (Not in a hospital admission)  [2]   Past Medical History:  Diagnosis Date    Encounter for general adult medical examination without abnormal findings 01/22/2021    Encounter for Medicare annual wellness exam    Encounter for immunization     Encounter for immunization    History of repair of retinal tear by laser photocoagulation 09/19/2023    History of sick sinus syndrome 01/05/2024    Personal history of other diseases of the circulatory system 11/02/2021    H/O sick sinus syndrome    Personal history of other diseases of the circulatory system 11/06/2021    History of hypertension    Personal history of other  diseases of the circulatory system 05/16/2022    History of atrial fibrillation    Presence of cardiac pacemaker     History of cardiac pacemaker   [3]   Past Surgical History:  Procedure Laterality Date    OTHER SURGICAL HISTORY  01/30/2022    Tonsillectomy    OTHER SURGICAL HISTORY  01/30/2022    Complete colonoscopy    OTHER SURGICAL HISTORY  11/02/2021    Pacemaker insertion

## 2025-07-11 NOTE — PATIENT INSTRUCTIONS
Antibiotics as directed; take with food  Over the counter Coricidin HBP as needed  Follow up with new or worsening symptoms

## 2025-07-14 ENCOUNTER — HOSPITAL ENCOUNTER (OUTPATIENT)
Dept: CARDIOLOGY | Facility: HOSPITAL | Age: 80
Discharge: HOME | End: 2025-07-14
Payer: MEDICARE

## 2025-07-14 DIAGNOSIS — Z95.0 PACEMAKER: ICD-10-CM

## 2025-07-14 DIAGNOSIS — I49.5 SICK SINUS SYNDROME (MULTI): ICD-10-CM

## 2025-07-14 PROCEDURE — 93296 REM INTERROG EVL PM/IDS: CPT

## 2025-07-14 PROCEDURE — 93294 REM INTERROG EVL PM/LDLS PM: CPT | Performed by: INTERNAL MEDICINE

## 2025-07-17 ENCOUNTER — OFFICE VISIT (OUTPATIENT)
Dept: PRIMARY CARE | Facility: CLINIC | Age: 80
End: 2025-07-17
Payer: MEDICARE

## 2025-07-17 VITALS
OXYGEN SATURATION: 97 % | HEART RATE: 64 BPM | TEMPERATURE: 97.8 F | BODY MASS INDEX: 32.71 KG/M2 | DIASTOLIC BLOOD PRESSURE: 88 MMHG | RESPIRATION RATE: 16 BRPM | WEIGHT: 215.8 LBS | HEIGHT: 68 IN | SYSTOLIC BLOOD PRESSURE: 166 MMHG

## 2025-07-17 DIAGNOSIS — G47.33 OBSTRUCTIVE SLEEP APNEA, ADULT: ICD-10-CM

## 2025-07-17 DIAGNOSIS — I10 ESSENTIAL HYPERTENSION: ICD-10-CM

## 2025-07-17 DIAGNOSIS — J01.31 ACUTE RECURRENT SPHENOIDAL SINUSITIS: ICD-10-CM

## 2025-07-17 DIAGNOSIS — E78.2 MIXED HYPERLIPIDEMIA: ICD-10-CM

## 2025-07-17 DIAGNOSIS — Z00.00 MEDICARE ANNUAL WELLNESS VISIT, SUBSEQUENT: Primary | ICD-10-CM

## 2025-07-17 DIAGNOSIS — I48.0 PAROXYSMAL ATRIAL FIBRILLATION (MULTI): ICD-10-CM

## 2025-07-17 DIAGNOSIS — E03.9 ACQUIRED HYPOTHYROIDISM: ICD-10-CM

## 2025-07-17 DIAGNOSIS — E55.9 VITAMIN D DEFICIENCY: ICD-10-CM

## 2025-07-17 PROCEDURE — G0439 PPPS, SUBSEQ VISIT: HCPCS | Performed by: FAMILY MEDICINE

## 2025-07-17 PROCEDURE — 99497 ADVNCD CARE PLAN 30 MIN: CPT | Performed by: FAMILY MEDICINE

## 2025-07-17 RX ORDER — DOXYCYCLINE 100 MG/1
100 CAPSULE ORAL 2 TIMES DAILY
Qty: 20 CAPSULE | Refills: 0 | Status: SHIPPED | OUTPATIENT
Start: 2025-07-17 | End: 2025-07-27

## 2025-07-17 RX ORDER — FLUTICASONE PROPIONATE 50 MCG
1 SPRAY, SUSPENSION (ML) NASAL DAILY
Qty: 16 G | Refills: 2 | Status: SHIPPED | OUTPATIENT
Start: 2025-07-17 | End: 2026-07-17

## 2025-07-17 ASSESSMENT — ACTIVITIES OF DAILY LIVING (ADL)
MANAGING_FINANCES: INDEPENDENT
TAKING_MEDICATION: INDEPENDENT
DOING_HOUSEWORK: INDEPENDENT
GROCERY_SHOPPING: INDEPENDENT
DRESSING: INDEPENDENT
BATHING: INDEPENDENT

## 2025-07-17 ASSESSMENT — ENCOUNTER SYMPTOMS
OCCASIONAL FEELINGS OF UNSTEADINESS: 1
DEPRESSION: 0
LOSS OF SENSATION IN FEET: 0

## 2025-07-17 ASSESSMENT — PATIENT HEALTH QUESTIONNAIRE - PHQ9
2. FEELING DOWN, DEPRESSED OR HOPELESS: NOT AT ALL
SUM OF ALL RESPONSES TO PHQ9 QUESTIONS 1 AND 2: 0
1. LITTLE INTEREST OR PLEASURE IN DOING THINGS: NOT AT ALL

## 2025-07-17 NOTE — PROGRESS NOTES
Subjective   Reason for Visit: Daisy Borja is an 79 y.o. female here for a Medicare Wellness visit.       Past Medical, Surgical, and Family History reviewed and updated in chart.         HPI  History of Present Illness  The patient is a 79-year-old  female who presents for a Medicare annual wellness visit/subsequent. She also complains of sinus discomfort, mostly affecting behind her ears. She complains of pain to the back of the head onset approximately 1-1/2 weeks ago. She has tried over the counter medications and did have some prescription for antibiotics from urgent care.    She reports experiencing sinus discomfort, particularly behind her ears, and pain at the back of her head that started about 1.5 weeks ago. She has sought relief through over-the-counter medications and was prescribed antibiotics by an urgent care facility.       Patient Care Team:  Rupert Balderrama DO as PCP - General (Family Medicine)  Rupert Balderrama DO as PCP - [employer] Medicare Advantage PCP  Davion Florentino MD as Cardiologist (Electrophysiology)       12 Systems have been reviewed as follows.  Constitutional: Fever, weight gain, weight loss, appetite change, night sweats, fatigue, chills.  Eyes : blurry, double vision, vision, loss, tearing, redness, pain, sensitivity to light, glaucoma.  Ears, nose, mouth, and throat: Hearing loss, ringing in the ears, ear pain, nasal congestion, nasal drainage, nosebleeds, mouth, throat, irritation tooth problem.  Cardiovascular :chest pain, pressure, heart racing, palpitations, sweating, leg swelling, high or low blood pressure  Pulmonary: Cough, yellow or green sputum, blood and sputum, shortness of breath, wheezing  Gastrointestinal: Nausea, vomiting, diarrhea, constipation, pain, blood in stool, or vomitus, heartburn, difficulty swallowing  Genitourinary: incontinence, abnormal bleeding, abnormal discharge, urinary frequency, urinary hesitancy, pain, impotence sexual problem,  "infection, urinary retention  Musculoskeletal: Pain, stiffness, joint, redness or warmth, arthritis, back pain, weakness, muscle wasting, sprain or fracture  Neuro: Weight weakness, dizziness, change in voice, change in taste change in vision, change in hearing, loss, or change of sensation, trouble walking, balance problems coordination problems, shaking, speech problem  Endocrine , cold or heat intolerance, blood sugar problem, weight gain or loss missed periods hot flashes, sweats, change in body hair, change in libido, increased thirst, increased urination  Heme/lymph: Swelling, bleeding, problem anemia, bruising, enlarged lymph nodes  Allergic/immunologic: H. plus nasal drip, watery itchy eyes, nasal drainage, immunosuppressed  The above were reviewed and noted negative except as noted in HPI and Problem List.    Objective   Vitals:  /88 (BP Location: Right arm, Patient Position: Sitting, BP Cuff Size: Large adult)   Pulse 64   Temp 36.6 °C (97.8 °F) (Temporal)   Resp 16   Ht 1.727 m (5' 8\")   Wt 97.9 kg (215 lb 12.8 oz)   LMP  (LMP Unknown)   SpO2 97%   BMI 32.81 kg/m²         Physical Exam  Vital Signs  Blood pressure is 166/88.     Constitutional: Well developed, well nourished, alert and in no acute distress   Eyes: Normal external exam. Pupils equally round and reactive to light with normal accommodation and extraocular movements intact.  Neck: Supple, no lymphadenopathy or masses.   Cardiovascular: Regular rate and rhythm, normal S1 and S2, no murmurs, gallops, or rubs. Radial pulses normal. No peripheral edema.  Pulmonary: No respiratory distress, lungs clear to auscultation bilaterally. No wheezes, rhonchi, rales.  Abdomen: soft,non tender, non distended, without masses or HSM  Skin: Warm, well perfused, normal skin turgor and color.   Neurologic: Cranial nerves II-XII grossly intact.   Psychiatric: Mood calm and affect normal  Musculoskeletal: Moving all extremities without " restriction  The above were reviewed and noted negative except as noted in HPI and Problem List.    Problem List Items Addressed This Visit       Essential hypertension                  Hyperlipidemia                  Obstructive sleep apnea, adult                  Paroxysmal atrial fibrillation (Multi)                  Vitamin D deficiency                  Hypothyroidism                   Other Visit Diagnoses         Medicare annual wellness visit, subsequent    -  Primary      Acute recurrent sphenoidal sinusitis        Relevant Medications    doxycycline (Vibramycin) 100 mg capsule    fluticasone (Flonase) 50 mcg/actuation nasal spray    Other Relevant Orders    XR paranasal sinuses 3+ views    CT sinus wo IV contrast             Assessment & Plan  Medicare annual wellness visit, subsequent         Acute recurrent sphenoidal sinusitis    Orders:    doxycycline (Vibramycin) 100 mg capsule; Take 1 capsule (100 mg) by mouth 2 times a day for 10 days. Take with at least 8 ounces (large glass) of water, do not lie down for 30 minutes after    XR paranasal sinuses 3+ views; Future    fluticasone (Flonase) 50 mcg/actuation nasal spray; Administer 1 spray into each nostril once daily. Shake gently. Before first use, prime pump. After use, clean tip and replace cap.    CT sinus wo IV contrast; Future    Essential hypertension         Mixed hyperlipidemia         Paroxysmal atrial fibrillation (Multi)         Acquired hypothyroidism         Vitamin D deficiency         Obstructive sleep apnea, adult            Results         Assessment & Plan

## 2025-07-31 ENCOUNTER — TELEMEDICINE (OUTPATIENT)
Dept: PRIMARY CARE | Facility: CLINIC | Age: 80
End: 2025-07-31
Payer: MEDICARE

## 2025-07-31 ENCOUNTER — APPOINTMENT (OUTPATIENT)
Dept: PRIMARY CARE | Facility: CLINIC | Age: 80
End: 2025-07-31
Payer: MEDICARE

## 2025-07-31 DIAGNOSIS — E78.2 MIXED HYPERLIPIDEMIA: ICD-10-CM

## 2025-07-31 DIAGNOSIS — L03.116 CELLULITIS OF LEFT LEG: Primary | ICD-10-CM

## 2025-07-31 DIAGNOSIS — J01.00 SUBACUTE MAXILLARY SINUSITIS: ICD-10-CM

## 2025-07-31 DIAGNOSIS — I10 ESSENTIAL HYPERTENSION: ICD-10-CM

## 2025-07-31 PROCEDURE — 1159F MED LIST DOCD IN RCRD: CPT | Performed by: FAMILY MEDICINE

## 2025-07-31 PROCEDURE — 1158F ADVNC CARE PLAN TLK DOCD: CPT | Performed by: FAMILY MEDICINE

## 2025-07-31 PROCEDURE — 1160F RVW MEDS BY RX/DR IN RCRD: CPT | Performed by: FAMILY MEDICINE

## 2025-07-31 PROCEDURE — 99212 OFFICE O/P EST SF 10 MIN: CPT | Performed by: FAMILY MEDICINE

## 2025-07-31 NOTE — PROGRESS NOTES
Subjective   Patient ID: Daisy Borja is a 79 y.o. female who presents for Follow-up (Patient completed antibiotic treatment to manage sinus problem and Pt reports she still using Nasal spray PRN.) and Skin Problem (Pt reports that after a skin biopsy performed by Dermatology she is presenting redness on left leg.).      Virtual or Telephone Consent    While technically available, the patient was unable or unwilling to consent to connect via audio/video telehealth technology; therefore, I performed this visit using a real-time audio only connection between Daisy Borja & Rupert Balderrama DO.  Verbal consent was requested and obtained from Daisy Borja on this date, 08/03/25 for a telehealth visit and the patient's location was confirmed at the time of the visit.     History of Present Illness  The patient presents via virtual visit for a sinus infection and localized redness and irritation following a recent skin biopsy of the foreleg by dermatology. She is concerned about further infection.    She reports an improvement in her sinus condition, although she experiences slight worsening when there are changes in air pressure, such as during a storm. She has been using a nasal spray, which she finds more effective than Tylenol.    A skin biopsy was performed on her leg, nose, and back. The one on her nose is healing well, and her  reports that the one on her back appears fine. However, she is concerned about the one on her leg. She has not been prescribed any antibiotics.    Her breathing at night is good due to her use of CPAP, which she relies on for a good night's sleep.    Sleep: She relies on CPAP for a good night's sleep.    Review of Systems   Constitutional: Negative.  Negative for activity change, appetite change, fatigue and fever.   HENT:  Negative for congestion, dental problem, ear discharge, ear pain, mouth sores, rhinorrhea, sinus pressure, sinus pain, sore throat, tinnitus and trouble swallowing.     Eyes:  Negative for photophobia, pain and visual disturbance.   Respiratory:  Negative for cough, chest tightness, shortness of breath and stridor.    Cardiovascular:  Negative for chest pain and palpitations.   Gastrointestinal:  Negative for abdominal distention, abdominal pain, blood in stool, constipation, diarrhea and rectal pain.   Endocrine: Negative for cold intolerance, heat intolerance, polydipsia, polyphagia and polyuria.   Genitourinary:  Negative for dysuria, flank pain, hematuria and urgency.   Musculoskeletal:  Negative for arthralgias, gait problem, myalgias and neck stiffness.   Skin:  Positive for color change and wound. Negative for rash.   Allergic/Immunologic: Negative for environmental allergies and food allergies.   Neurological:  Negative for dizziness, seizures, syncope, speech difficulty and headaches.   Hematological:  Negative for adenopathy.   Psychiatric/Behavioral:  Negative for agitation, confusion, decreased concentration, dysphoric mood and sleep disturbance. The patient is not nervous/anxious.        Objective     LMP  (LMP Unknown)      Physical Exam  Skin: Localized redness and irritation at the site of the recent skin biopsy on the foreleg.  Alert, oriented x 3, affect pleasant  Problem List Items Addressed This Visit       Essential hypertension    Hyperlipidemia     Other Visit Diagnoses         Cellulitis of left leg    -  Primary      Subacute maxillary sinusitis                 Assessment & Plan  1. Sinus infection.  - Significant improvement in sinus symptoms with nasal spray use.  - Prefers nasal spray over Tylenol for symptom management.  - Reports better breathing at night with CPAP use.  - Advised to continue using nasal spray as needed.    2. Localized redness and irritation post-skin biopsy.  - Localized redness and irritation on foreleg following recent skin biopsy.  - No antibiotics prescribed; advised to use Neosporin.  - Advised to apply Neosporin at least  twice daily to the affected area.  - If condition worsens, instructed to take a picture and send it to the provided phone number for further evaluation.    Consent obtained by Daisy Borja for audio communication. Total time for this audio communication visit is 5 minutes.       Rupert Balderrama DO     This medical note was created with the assistance of artificial intelligence (AI) for documentation purposes. The content has been reviewed and confirmed by the healthcare provider for accuracy and completeness. Patient consented to the use of audio recording and use of AI during their visit.

## 2025-08-03 ASSESSMENT — ENCOUNTER SYMPTOMS
EYE PAIN: 0
PHOTOPHOBIA: 0
CHEST TIGHTNESS: 0
SINUS PRESSURE: 0
RECTAL PAIN: 0
ADENOPATHY: 0
HEMATURIA: 0
MYALGIAS: 0
RHINORRHEA: 0
ABDOMINAL PAIN: 0
SHORTNESS OF BREATH: 0
SLEEP DISTURBANCE: 0
DIARRHEA: 0
TROUBLE SWALLOWING: 0
FLANK PAIN: 0
AGITATION: 0
SINUS PAIN: 0
STRIDOR: 0
ACTIVITY CHANGE: 0
SEIZURES: 0
DIZZINESS: 0
APPETITE CHANGE: 0
DYSPHORIC MOOD: 0
COLOR CHANGE: 1
SPEECH DIFFICULTY: 0
CONSTIPATION: 0
ARTHRALGIAS: 0
COUGH: 0
POLYDIPSIA: 0
FATIGUE: 0
PALPITATIONS: 0
POLYPHAGIA: 0
NECK STIFFNESS: 0
CONSTITUTIONAL NEGATIVE: 1
DYSURIA: 0
WOUND: 1
DECREASED CONCENTRATION: 0
HEADACHES: 0
BLOOD IN STOOL: 0
FEVER: 0
CONFUSION: 0
NERVOUS/ANXIOUS: 0
SORE THROAT: 0
ABDOMINAL DISTENTION: 0

## 2025-08-07 ENCOUNTER — OFFICE VISIT (OUTPATIENT)
Dept: PRIMARY CARE | Facility: CLINIC | Age: 80
End: 2025-08-07
Payer: MEDICARE

## 2025-08-07 VITALS
HEART RATE: 63 BPM | OXYGEN SATURATION: 94 % | WEIGHT: 215.6 LBS | RESPIRATION RATE: 16 BRPM | TEMPERATURE: 97.9 F | HEIGHT: 68 IN | BODY MASS INDEX: 32.67 KG/M2 | SYSTOLIC BLOOD PRESSURE: 154 MMHG | DIASTOLIC BLOOD PRESSURE: 73 MMHG

## 2025-08-07 DIAGNOSIS — J34.89 NASAL CONGESTION WITH RHINORRHEA: ICD-10-CM

## 2025-08-07 DIAGNOSIS — J00 NASOPHARYNGITIS: ICD-10-CM

## 2025-08-07 DIAGNOSIS — R09.81 NASAL CONGESTION WITH RHINORRHEA: ICD-10-CM

## 2025-08-07 DIAGNOSIS — E66.811 CLASS 1 OBESITY WITH SERIOUS COMORBIDITY AND BODY MASS INDEX (BMI) OF 32.0 TO 32.9 IN ADULT, UNSPECIFIED OBESITY TYPE: ICD-10-CM

## 2025-08-07 DIAGNOSIS — J32.9 RECURRENT SINUSITIS: Primary | ICD-10-CM

## 2025-08-07 PROCEDURE — 99213 OFFICE O/P EST LOW 20 MIN: CPT | Performed by: NURSE PRACTITIONER

## 2025-08-07 RX ORDER — DOXYCYCLINE 100 MG/1
100 CAPSULE ORAL 2 TIMES DAILY
Qty: 20 CAPSULE | Refills: 0 | Status: SHIPPED | OUTPATIENT
Start: 2025-08-07 | End: 2025-08-17

## 2025-08-07 RX ORDER — PREDNISONE 10 MG/1
TABLET ORAL
Qty: 30 TABLET | Refills: 0 | Status: SHIPPED | OUTPATIENT
Start: 2025-08-07 | End: 2025-08-17

## 2025-08-07 ASSESSMENT — PATIENT HEALTH QUESTIONNAIRE - PHQ9
1. LITTLE INTEREST OR PLEASURE IN DOING THINGS: NOT AT ALL
SUM OF ALL RESPONSES TO PHQ9 QUESTIONS 1 AND 2: 0
2. FEELING DOWN, DEPRESSED OR HOPELESS: NOT AT ALL
1. LITTLE INTEREST OR PLEASURE IN DOING THINGS: NOT AT ALL
SUM OF ALL RESPONSES TO PHQ9 QUESTIONS 1 AND 2: 0
2. FEELING DOWN, DEPRESSED OR HOPELESS: NOT AT ALL

## 2025-08-07 ASSESSMENT — ENCOUNTER SYMPTOMS
LOSS OF SENSATION IN FEET: 0
DEPRESSION: 0
OCCASIONAL FEELINGS OF UNSTEADINESS: 0

## 2025-08-07 ASSESSMENT — PAIN SCALES - GENERAL: PAINLEVEL_OUTOF10: 8

## 2025-08-07 NOTE — PROGRESS NOTES
"Subjective   Patient ID: Daisy Borja is a 79 y.o. female who presents for URI.      Symptoms: sinus pressure in her head, slight dizzy, ear are slight pressure in the ears,runny nose congestion, sore throat,   Length of symptoms: 2 weeks ago  OTC: nasal spray, tylenol with mild help.  Related information:  pt did see her primary 2 weeks ago with no help.  HPI     Review of Systems    Objective   /73 Comment: auto  Pulse 63   Temp 36.6 °C (97.9 °F)   Resp 16   Ht 1.727 m (5' 8\")   Wt 97.8 kg (215 lb 9.6 oz)   LMP  (LMP Unknown)   SpO2 94%   BMI 32.78 kg/m²     Physical Exam    Assessment/Plan          "

## 2025-08-07 NOTE — PROGRESS NOTES
Subjective   Patient ID: Daisy Borja is a 79 y.o. female who is with a chief complaint of symptoms of respiratory tract infection.     HPI   Patient is a 79 y.o. female who CONSULTED AT [facility] today. Patient is with symptoms of nasal congestion, nasal discharge, mild headache, frontal sinus pain, mild sore throat, mild post nasal drip, mild cough, and fatigue. She denies having any muscle ache, loss of sense of taste, loss of sense of smell, diarrhea, chills nor fever. Patient states that present condition started about 1 month ago. Patient denies history of recent travel, exposure to person/people who tested positive for COVID 19, nor exposure to person/people with flu like symptoms. she denies shortness of breath, chest pain, palpitations, nor edema. she stated that she  tried OTC medications which afforded only slight relief of symptoms. she denies nausea, vomiting, abdominal pain, nor any other symptoms. She states that her sinus infections are getting more and more frequent.     Patient states she had not received any COVID vaccine yet.  Patient states she have not yet received flu shot for this season.    Review of Systems  General: no weight loss, generally healthy, (+) fatigue  Head: (+) mild headache, (+) frontal sinus pain, no vertigo, no injury  Eyes: no diplopia, no tearing, no pain,   Ears: no change in hearing, no tinnitus, no bleeding, no vertigo  Mouth:  no dental difficulties, no gingival bleeding, (+) mild sore throat, no loss of sense of taste, (+) mild post nasal drip,   Nose: (+) congestion, (+) discharge, no bleeding, no obstruction, no loss of sense of smell  Neck: no stiffness, no pain, no tenderness, no masses, no bruit  Pulmonary: no dyspnea, no wheezing, no hemoptysis, (+) mild cough  Cardiovascular: no chest pain, no palpitations, no syncope, no orthopnea  Gastrointestinal: no change in appetite, no dysphagia, no abdominal pains, no diarrhea, no emesis, no  "melena  Genito Urinary: no dysuria, no urinary urgency, no nocturia, no incontinence, no change in nature of urine  Musculoskeletal: no muscle ache, no joint pain, no limitation of range of motion, no paresthesia, no numbness  Constitutional: no fever, no chills, no night sweats    Objective   /73 Comment: auto  Pulse 63   Temp 36.6 °C (97.9 °F)   Resp 16   Ht 1.727 m (5' 8\")   Wt 97.8 kg (215 lb 9.6 oz)   LMP  (LMP Unknown)   SpO2 94%   BMI 32.78 kg/m²     Physical Exam  General: ambulatory, in no acute distress  Head: normocephalic, no lesions, (+) frontal sinus tenderness  Eyes: pink palpebral conjunctiva, anicteric sclerae, PERRLA, EOM's full  Ears: clear external auditory canals, no ear discharge, no bleeding from the ears, tympanic membrane intact  Nose: (+) congested nasal mucosa, (+) yellow mucoid nasal discharge, no bleeding, no obstruction  Throat: (+) erythema, and (+) exudate on posterior pharyngeal wall, no lesion  Neck: supple, no masses, no bruits, no CLADP  Chest: symmetrical chest expansion, no lagging, no retractions, clear breath sounds, no rales, no wheezes    Assessment/Plan   Problem List Items Addressed This Visit    None  Visit Diagnoses         Codes      Recurrent sinusitis    -  Primary J32.9    Relevant Medications    predniSONE (Deltasone) 10 mg tablet    doxycycline (Vibramycin) 100 mg capsule      Nasal congestion with rhinorrhea     R09.81, J34.89    Relevant Medications    predniSONE (Deltasone) 10 mg tablet    doxycycline (Vibramycin) 100 mg capsule      Nasopharyngitis     J00    Relevant Medications    predniSONE (Deltasone) 10 mg tablet    doxycycline (Vibramycin) 100 mg capsule      BMI 32.0-32.9,adult     Z68.32      Class 1 obesity with serious comorbidity and body mass index (BMI) of 32.0 to 32.9 in adult, unspecified obesity type     E66.811, Z68.32        DISCHARGE SUMMARY:   Patient was seen and examined. Diagnosis, treatment, treatment options, and possible " complications of today's illness discussed and explained to patient. Patient to take medication/s associated with this visit. Patient may take OTC decongestant of choice as needed. Patient may also take OTC analgesic/ antipyretic if needed for pain/fever. Advised to increase oral fluid intake. Advised steam inhalation if needed to relieve congestion. Advised warm saline gargle if needed to relieve throat discomfort. Advised Listerine antiseptic mouthwash gargle TID. Patient may use Cepacol oral spray as needed to relieve throat discomfort. Patient was advised to discard the old toothbrush and use a new toothbrush beginning on the third of antibiotics. Advised to come back if with worsening or persistent symptoms. Patient verbalized understanding of plan of care.    Patient to come back in 7 - 10 days if needed for worsening symptoms.

## 2025-08-18 ENCOUNTER — OFFICE VISIT (OUTPATIENT)
Dept: PRIMARY CARE | Facility: CLINIC | Age: 80
End: 2025-08-18
Payer: MEDICARE

## 2025-08-18 VITALS
HEIGHT: 68 IN | OXYGEN SATURATION: 94 % | BODY MASS INDEX: 32.71 KG/M2 | TEMPERATURE: 97.7 F | RESPIRATION RATE: 16 BRPM | WEIGHT: 215.8 LBS | HEART RATE: 61 BPM | SYSTOLIC BLOOD PRESSURE: 170 MMHG | DIASTOLIC BLOOD PRESSURE: 70 MMHG

## 2025-08-18 DIAGNOSIS — J01.31 ACUTE RECURRENT SPHENOIDAL SINUSITIS: Primary | ICD-10-CM

## 2025-08-18 PROCEDURE — 99213 OFFICE O/P EST LOW 20 MIN: CPT

## 2025-08-18 RX ORDER — FLUTICASONE PROPIONATE 50 MCG
1 SPRAY, SUSPENSION (ML) NASAL DAILY
Qty: 16 G | Refills: 2 | Status: SHIPPED | OUTPATIENT
Start: 2025-08-18 | End: 2026-08-18

## 2025-08-18 ASSESSMENT — ENCOUNTER SYMPTOMS
OCCASIONAL FEELINGS OF UNSTEADINESS: 0
DEPRESSION: 0
LOSS OF SENSATION IN FEET: 0

## 2025-09-03 ENCOUNTER — HOSPITAL ENCOUNTER (OUTPATIENT)
Dept: RADIOLOGY | Facility: HOSPITAL | Age: 80
Discharge: HOME | End: 2025-09-03
Payer: MEDICARE

## 2025-09-03 DIAGNOSIS — J01.31 ACUTE RECURRENT SPHENOIDAL SINUSITIS: ICD-10-CM

## 2025-09-03 PROCEDURE — 70486 CT MAXILLOFACIAL W/O DYE: CPT | Performed by: RADIOLOGY

## 2025-09-03 PROCEDURE — 70486 CT MAXILLOFACIAL W/O DYE: CPT

## 2025-09-08 ENCOUNTER — APPOINTMENT (OUTPATIENT)
Dept: PRIMARY CARE | Facility: CLINIC | Age: 80
End: 2025-09-08
Payer: MEDICARE

## 2025-09-15 ENCOUNTER — APPOINTMENT (OUTPATIENT)
Dept: CARDIOLOGY | Facility: CLINIC | Age: 80
End: 2025-09-15
Payer: MEDICARE

## 2025-10-16 ENCOUNTER — APPOINTMENT (OUTPATIENT)
Dept: PRIMARY CARE | Facility: CLINIC | Age: 80
End: 2025-10-16
Payer: MEDICARE